# Patient Record
Sex: FEMALE | Race: WHITE | Employment: OTHER | URBAN - METROPOLITAN AREA
[De-identification: names, ages, dates, MRNs, and addresses within clinical notes are randomized per-mention and may not be internally consistent; named-entity substitution may affect disease eponyms.]

---

## 2021-09-15 ENCOUNTER — LAB ENCOUNTER (OUTPATIENT)
Dept: LAB | Facility: HOSPITAL | Age: 71
End: 2021-09-15
Attending: INTERNAL MEDICINE
Payer: MEDICARE

## 2021-09-15 ENCOUNTER — OFFICE VISIT (OUTPATIENT)
Dept: RHEUMATOLOGY | Facility: CLINIC | Age: 71
End: 2021-09-15
Payer: COMMERCIAL

## 2021-09-15 VITALS
HEART RATE: 61 BPM | WEIGHT: 180 LBS | SYSTOLIC BLOOD PRESSURE: 150 MMHG | BODY MASS INDEX: 33.99 KG/M2 | HEIGHT: 61 IN | DIASTOLIC BLOOD PRESSURE: 76 MMHG

## 2021-09-15 DIAGNOSIS — M35.3 PMR (POLYMYALGIA RHEUMATICA) (HCC): ICD-10-CM

## 2021-09-15 DIAGNOSIS — M35.3 PMR (POLYMYALGIA RHEUMATICA) (HCC): Primary | ICD-10-CM

## 2021-09-15 LAB
HBV CORE AB SERPL QL IA: NONREACTIVE
HBV SURFACE AB SER QL: NONREACTIVE
HBV SURFACE AB SERPL IA-ACNC: <3.1 MIU/ML
HBV SURFACE AG SER-ACNC: <0.1 [IU]/L
HBV SURFACE AG SERPL QL IA: NONREACTIVE
HCV AB SERPL QL IA: NONREACTIVE
RHEUMATOID FACT SERPL-ACNC: <10 IU/ML (ref ?–15)

## 2021-09-15 PROCEDURE — 86803 HEPATITIS C AB TEST: CPT | Performed by: INTERNAL MEDICINE

## 2021-09-15 PROCEDURE — 86200 CCP ANTIBODY: CPT | Performed by: INTERNAL MEDICINE

## 2021-09-15 PROCEDURE — 86431 RHEUMATOID FACTOR QUANT: CPT | Performed by: INTERNAL MEDICINE

## 2021-09-15 PROCEDURE — 36415 COLL VENOUS BLD VENIPUNCTURE: CPT

## 2021-09-15 PROCEDURE — 87340 HEPATITIS B SURFACE AG IA: CPT | Performed by: INTERNAL MEDICINE

## 2021-09-15 PROCEDURE — 82955 ASSAY OF G6PD ENZYME: CPT | Performed by: INTERNAL MEDICINE

## 2021-09-15 PROCEDURE — 99204 OFFICE O/P NEW MOD 45 MIN: CPT | Performed by: INTERNAL MEDICINE

## 2021-09-15 PROCEDURE — 86704 HEP B CORE ANTIBODY TOTAL: CPT | Performed by: INTERNAL MEDICINE

## 2021-09-15 PROCEDURE — 86480 TB TEST CELL IMMUN MEASURE: CPT

## 2021-09-15 PROCEDURE — 86706 HEP B SURFACE ANTIBODY: CPT | Performed by: INTERNAL MEDICINE

## 2021-09-15 RX ORDER — PREDNISONE 10 MG/1
10 TABLET ORAL DAILY
Qty: 90 TABLET | Refills: 0 | Status: SHIPPED | OUTPATIENT
Start: 2021-09-15

## 2021-09-15 RX ORDER — VIT C/HESPERIDIN/BIOFLAVONOIDS 500-100 MG
TABLET ORAL
COMMUNITY

## 2021-09-15 RX ORDER — ALBUTEROL SULFATE 90 UG/1
AEROSOL, METERED RESPIRATORY (INHALATION)
COMMUNITY

## 2021-09-15 RX ORDER — FLUTICASONE PROPIONATE 50 MCG
SPRAY, SUSPENSION (ML) NASAL
COMMUNITY

## 2021-09-15 RX ORDER — LEVOTHYROXINE SODIUM 112 UG/1
112 TABLET ORAL
COMMUNITY

## 2021-09-15 RX ORDER — MULTIVITAMIN WITH IRON
250 TABLET ORAL
COMMUNITY

## 2021-09-15 RX ORDER — CHLORAL HYDRATE 500 MG
3 CAPSULE ORAL DAILY
COMMUNITY

## 2021-09-15 RX ORDER — UBIDECARENONE 100 MG
100 CAPSULE ORAL DAILY
COMMUNITY
End: 2022-01-20

## 2021-09-15 RX ORDER — KRILL/OM-3/DHA/EPA/PHOSPHO/AST 500MG-86MG
CAPSULE ORAL
COMMUNITY

## 2021-09-15 RX ORDER — CHOLECALCIFEROL (VITAMIN D3) 50 MCG
TABLET ORAL 2 TIMES DAILY
COMMUNITY

## 2021-09-15 RX ORDER — MULTIVIT,IRON,MINERALS/LUTEIN
TABLET ORAL
COMMUNITY

## 2021-09-15 RX ORDER — PREDNISONE 1 MG/1
5 TABLET ORAL DAILY
Qty: 90 TABLET | Refills: 0 | Status: SHIPPED | OUTPATIENT
Start: 2021-09-15

## 2021-09-15 NOTE — PATIENT INSTRUCTIONS
You were seen today for bilateral shoulder stiffness due to PMR  Plan to increase prednisone to 20 mg daily for 2 weeks then 17.5 mg daily for 2 weeks then 15 mg daily for 2 weeks and then stay on 12.5 mg daily  Sometimes PMR can convert to rheumatoid arth

## 2021-09-15 NOTE — PROGRESS NOTES
Damion Clifford is a 73year old female who presents for Patient presents with:  Consult  Muscle Pain: Muscle weakness, loss in range of motion  Fatigue  Neck Pain  .    HPI:   CC: b/l shoulder stiffness  Consult: referred by PCP Dr. Nando Lipscomb Take 112 mcg by mouth before breakfast.     • Fexofenadine HCl (ALLEGRA ALLERGY OR) Take by mouth as needed. • ascorbic acid 1000 MG Oral Tab Take 1,000 mg by mouth daily. • Coenzyme Q10 100 MG Oral Cap Take 100 mg by mouth daily.         History re stiffness  NEURO: Cranial nerves II-XII intact grossly. 5/5 strength throughout in both upper and lower extremities, sensation intact.   PSYCH: normal mood    LABS:     Scanned    IMAGING:     None    ASSESSMENT AND PLAN:     B/L shoulder stiffness 2/2 PMR

## 2021-09-17 LAB
CCP IGG SERPL-ACNC: 0.5 U/ML (ref 0–6.9)
GLUCOSE-6-PHOSPHATE DEHYDROGENASE: 11.7 U/G HB

## 2021-11-04 ENCOUNTER — OFFICE VISIT (OUTPATIENT)
Dept: RHEUMATOLOGY | Facility: CLINIC | Age: 71
End: 2021-11-04
Payer: COMMERCIAL

## 2021-11-04 VITALS
BODY MASS INDEX: 35.12 KG/M2 | DIASTOLIC BLOOD PRESSURE: 72 MMHG | HEART RATE: 72 BPM | SYSTOLIC BLOOD PRESSURE: 153 MMHG | WEIGHT: 186 LBS | HEIGHT: 61 IN

## 2021-11-04 DIAGNOSIS — M35.3 PMR (POLYMYALGIA RHEUMATICA) (HCC): Primary | ICD-10-CM

## 2021-11-04 PROCEDURE — 99214 OFFICE O/P EST MOD 30 MIN: CPT | Performed by: INTERNAL MEDICINE

## 2021-11-04 RX ORDER — PREDNISONE 1 MG/1
TABLET ORAL
Qty: 200 TABLET | Refills: 0 | Status: SHIPPED | OUTPATIENT
Start: 2021-11-04

## 2021-11-04 RX ORDER — PREDNISONE 1 MG/1
TABLET ORAL
Qty: 90 TABLET | Refills: 0 | Status: SHIPPED | OUTPATIENT
Start: 2021-11-04

## 2021-11-04 NOTE — PROGRESS NOTES
Meghana Nicholas is a 73year old female. HPI:   No chief complaint on file. I had the pleasure of seeing Meghana Nicholas on 11/4/2021 for follow up PMR.      Current Medications:  Prednisone 12.5 mg daily- started mid Aug 2021  Blood work:  Neg AN big component. She feels tired all the time  She states that her sleep is not adequate. She wakes up multiple times at night. At times will watch TV.   She also has gained some weight, about 6 pounds in the past 1.5 months  Denies any other joint pain or Aero Soln albuterol sulfate HFA 90 mcg/actuation aerosol inhaler       . cmed  Allergies:    Penicillins             RASH  Sulfa Antibiotics       RASH      ROS:   All other ROS are negative.      PHYSICAL EXAM:   GEN: AAOx3, NAD  HEENT: EOMI, PERRLA, no inj Hb 11.7   HCV AB      Nonreactive  Nonreactive     Imaging:     None    ASSESSMENT/PLAN:     B/L shoulder stiffness 2/2 PMR  - Symptoms are classic for PMR. Bilateral shoulder stiffness. Elevated CRP and response to prednisone.   Symptoms did start after

## 2021-11-04 NOTE — PATIENT INSTRUCTIONS
You were seen today for PMR, symptoms do appear better controlled  You do have some more stiffness in the right shoulder.   We will continue to monitor this  Continue to taper prednisone  Continue 12.5 mg daily for 1 more week then go down to 10 mg daily fo

## 2022-01-12 ENCOUNTER — LAB ENCOUNTER (OUTPATIENT)
Dept: LAB | Facility: HOSPITAL | Age: 72
End: 2022-01-12
Attending: INTERNAL MEDICINE
Payer: MEDICARE

## 2022-01-12 DIAGNOSIS — M35.3 PMR (POLYMYALGIA RHEUMATICA) (HCC): ICD-10-CM

## 2022-01-12 LAB
CRP SERPL-MCNC: 1.15 MG/DL (ref ?–0.3)
ERYTHROCYTE [SEDIMENTATION RATE] IN BLOOD: 8 MM/HR

## 2022-01-12 PROCEDURE — 85652 RBC SED RATE AUTOMATED: CPT

## 2022-01-12 PROCEDURE — 86140 C-REACTIVE PROTEIN: CPT

## 2022-01-12 PROCEDURE — 36415 COLL VENOUS BLD VENIPUNCTURE: CPT

## 2022-01-20 ENCOUNTER — OFFICE VISIT (OUTPATIENT)
Dept: RHEUMATOLOGY | Facility: CLINIC | Age: 72
End: 2022-01-20
Payer: MEDICARE

## 2022-01-20 VITALS
HEIGHT: 61 IN | WEIGHT: 190 LBS | SYSTOLIC BLOOD PRESSURE: 147 MMHG | DIASTOLIC BLOOD PRESSURE: 74 MMHG | BODY MASS INDEX: 35.87 KG/M2 | HEART RATE: 62 BPM

## 2022-01-20 DIAGNOSIS — M35.3 PMR (POLYMYALGIA RHEUMATICA) (HCC): Primary | ICD-10-CM

## 2022-01-20 DIAGNOSIS — Z51.81 MEDICATION MONITORING ENCOUNTER: ICD-10-CM

## 2022-01-20 PROCEDURE — 99213 OFFICE O/P EST LOW 20 MIN: CPT | Performed by: INTERNAL MEDICINE

## 2022-01-20 RX ORDER — PREDNISONE 1 MG/1
TABLET ORAL
Qty: 100 TABLET | Refills: 0 | Status: SHIPPED | OUTPATIENT
Start: 2022-01-20

## 2022-01-20 NOTE — PATIENT INSTRUCTIONS
You were seen today for PMR  Symptoms continue to be stable  Recent blood work shows slightly elevated inflammation marker  Continue to taper prednisone take 5 mg daily for 2 weeks then 4 mg daily for 2 weeks then 3 mg daily for 2 weeks then 2 mg daily for

## 2022-01-20 NOTE — PROGRESS NOTES
Joshua Marroquin is a 73year old female. HPI:   Patient presents with: Follow - Up  Medication Follow-Up  Condition Update      I had the pleasure of seeing Joshua Marroquin on 1/20/20221 for follow up PMR.      Current Medications:  Pred stiffness in her thighs or hip region  She reports fatigue being a big component. She feels tired all the time  She states that her sleep is not adequate. She wakes up multiple times at night. At times will watch TV.   She also has gained some weight, ab Oral Tab Take by mouth. • Calcium Carbonate-Vit D-Min (CALTRATE 600+D PLUS OR) Take by mouth. • Krill Oil 500 MG Oral Cap Take by mouth. • Probiotic Product ([place]) Take by mouth.      • Lactobacillus (ACIDOPHOLUS OR) Take by IU/mL 0.08   Quantiferon-TB2 Minus NIL      IU/mL 0.02   Quantiferon TB Mitogen minus NIL      IU/mL >10.00   QTB. RESULT      Negative Negative   HBSAg Screen      Nonreactive  Nonreactive   Hbsag Screen Index       <0.10   HEPATITIS B SURFACE AB QUAL

## 2022-03-07 ENCOUNTER — PATIENT MESSAGE (OUTPATIENT)
Dept: RHEUMATOLOGY | Facility: CLINIC | Age: 72
End: 2022-03-07

## 2022-03-07 NOTE — TELEPHONE ENCOUNTER
From: Wisam Timmons  To: Barb Diaz MD  Sent: 3/7/2022 11:01 AM CST  Subject: reducing prednisone    I have been on 4 mg of prednisone for the last 2 weeks and will reduce my dose starting tomorrow. I was wondering if I could just drop 1/2 a mg at a time instead of a full mg since I had some stiffness and pain last time I dropped a full mg. Maybe drop 1/2 mg every week or 10 days. Let me know what you think. Thank you.

## 2022-03-17 ENCOUNTER — PATIENT MESSAGE (OUTPATIENT)
Dept: RHEUMATOLOGY | Facility: CLINIC | Age: 72
End: 2022-03-17

## 2022-03-17 NOTE — TELEPHONE ENCOUNTER
Dr. Paulino Slight, please review message below about tapering prednisone and request for a new script and advise. Prednisone pended, please change sig.      \"You were seen today for PMR  Symptoms continue to be stable  Recent blood work shows slightly elevated inflammation marker  Continue to taper prednisone take 5 mg daily for 2 weeks then 4 mg daily for 2 weeks then 3 mg daily for 2 weeks then 2 mg daily for 2 weeks then 1 mg daily for 2 weeks and then stop  Blood work in 2 mos   Follow up in 2 mos\"

## 2022-03-17 NOTE — TELEPHONE ENCOUNTER
From: Yasmine Trejo  To: Kd Melendrez MD  Sent: 3/17/2022 11:17 AM CDT  Subject: refill 1mg prednisone needed & questions    Could you please order about 100 - 1 mg prednisone for me from [place], [place]? I have enough for a couple of days. I am going to drop to 3 mg tomorrow. I am going to try to drop 1/2 mg every 10 days. Since I am going down at a slower rate, do you want me to get my labs done and a visit to you when I am completely off the prednisone or sooner. Originally we thought I would be dropping at 1 mg every 2 weeks and you order labs for mid March and asked me to make an appointment to see you when I was almost off the prednisone. Let me know. Thank you.

## 2022-03-21 RX ORDER — PREDNISONE 1 MG/1
TABLET ORAL
Qty: 100 TABLET | Refills: 0 | Status: SHIPPED | OUTPATIENT
Start: 2022-03-21

## 2022-04-05 ENCOUNTER — TELEPHONE (OUTPATIENT)
Dept: RHEUMATOLOGY | Facility: CLINIC | Age: 72
End: 2022-04-05

## 2022-04-05 NOTE — TELEPHONE ENCOUNTER
Patient has pain in achilles tendon, swollen, throbbing, burning, hurts to touch even, limping very much, started 3-31; she has been decreasing her Prednisone, taking 2 mg currently. Should she see Dr. Ríos Sees or her pcp? Please call patient to advise.

## 2022-04-05 NOTE — TELEPHONE ENCOUNTER
Called patient back. Developed Achilles tendinitis about a week ago. Hard to walk on her foot due to the pain. Her PMR symptoms are stable on 2 mg of prednisone. Advised patient that she can take Advil 1 to 2 pills a day as needed and Tylenol arthritis. She will be seeing her PCP today.   I think it is less likely from Krish 73 but if she continues to have joint pain and swelling in other joints it could be an inflammatory process

## 2022-04-05 NOTE — TELEPHONE ENCOUNTER
Please see below. Pt reports 6 days of worsening pain in achilles tendon. Rates pain 1/10 at rest and 5-10/10 with movement. Reports throbbing, burning pain. Tried OTC Advil, uncertain if it provided relief. Denies injury or overuse. Pt is concerned it it related to PMR. Pt advised to see PCP for further evaluation and management due to acute onset. Please advise.

## 2022-04-19 ENCOUNTER — LAB ENCOUNTER (OUTPATIENT)
Dept: LAB | Facility: HOSPITAL | Age: 72
End: 2022-04-19
Attending: INTERNAL MEDICINE
Payer: MEDICARE

## 2022-04-19 DIAGNOSIS — M35.3 PMR (POLYMYALGIA RHEUMATICA) (HCC): ICD-10-CM

## 2022-04-19 LAB
CRP SERPL-MCNC: 1.94 MG/DL (ref ?–0.3)
ERYTHROCYTE [SEDIMENTATION RATE] IN BLOOD: 10 MM/HR

## 2022-04-19 PROCEDURE — 86140 C-REACTIVE PROTEIN: CPT

## 2022-04-19 PROCEDURE — 85652 RBC SED RATE AUTOMATED: CPT

## 2022-04-19 PROCEDURE — 36415 COLL VENOUS BLD VENIPUNCTURE: CPT

## 2022-04-25 ENCOUNTER — OFFICE VISIT (OUTPATIENT)
Dept: RHEUMATOLOGY | Facility: CLINIC | Age: 72
End: 2022-04-25
Payer: MEDICARE

## 2022-04-25 ENCOUNTER — LAB ENCOUNTER (OUTPATIENT)
Dept: LAB | Facility: HOSPITAL | Age: 72
End: 2022-04-25
Attending: INTERNAL MEDICINE
Payer: MEDICARE

## 2022-04-25 VITALS
SYSTOLIC BLOOD PRESSURE: 143 MMHG | HEIGHT: 61 IN | HEART RATE: 70 BPM | DIASTOLIC BLOOD PRESSURE: 79 MMHG | BODY MASS INDEX: 35.87 KG/M2 | WEIGHT: 190 LBS

## 2022-04-25 DIAGNOSIS — Z51.81 MEDICATION MONITORING ENCOUNTER: ICD-10-CM

## 2022-04-25 DIAGNOSIS — M35.3 PMR (POLYMYALGIA RHEUMATICA) (HCC): ICD-10-CM

## 2022-04-25 DIAGNOSIS — M35.3 PMR (POLYMYALGIA RHEUMATICA) (HCC): Primary | ICD-10-CM

## 2022-04-25 LAB
CK SERPL-CCNC: 84 U/L
RHEUMATOID FACT SERPL-ACNC: <10 IU/ML (ref ?–15)

## 2022-04-25 PROCEDURE — 81374 HLA I TYPING 1 ANTIGEN LR: CPT

## 2022-04-25 PROCEDURE — 86431 RHEUMATOID FACTOR QUANT: CPT | Performed by: INTERNAL MEDICINE

## 2022-04-25 PROCEDURE — 99214 OFFICE O/P EST MOD 30 MIN: CPT | Performed by: INTERNAL MEDICINE

## 2022-04-25 PROCEDURE — 82550 ASSAY OF CK (CPK): CPT

## 2022-04-25 PROCEDURE — 36415 COLL VENOUS BLD VENIPUNCTURE: CPT

## 2022-04-25 PROCEDURE — 86200 CCP ANTIBODY: CPT | Performed by: INTERNAL MEDICINE

## 2022-04-25 RX ORDER — PREDNISONE 1 MG/1
5 TABLET ORAL DAILY
Qty: 30 TABLET | Refills: 0 | Status: SHIPPED | OUTPATIENT
Start: 2022-04-25

## 2022-04-25 RX ORDER — PREDNISONE 1 MG/1
TABLET ORAL
Qty: 200 TABLET | Refills: 0 | Status: SHIPPED | OUTPATIENT
Start: 2022-04-25

## 2022-04-25 NOTE — PATIENT INSTRUCTIONS
You were seen today for PMR  Blood work today   Prednisone taper down as directed, 5 mg daily for 3 weeks then 4.5 mg daily for 3 weeks and continue go to go down by 1/2 mg every 3 weeks   Follow up in 2 mos

## 2022-04-26 LAB — CCP IGG SERPL-ACNC: 1.4 U/ML (ref 0–6.9)

## 2022-04-27 LAB — HLA-B27: NEGATIVE

## 2022-05-19 ENCOUNTER — OFFICE VISIT (OUTPATIENT)
Dept: RHEUMATOLOGY | Facility: CLINIC | Age: 72
End: 2022-05-19
Payer: MEDICARE

## 2022-05-19 VITALS
SYSTOLIC BLOOD PRESSURE: 147 MMHG | BODY MASS INDEX: 35.68 KG/M2 | WEIGHT: 189 LBS | DIASTOLIC BLOOD PRESSURE: 69 MMHG | HEIGHT: 61 IN | HEART RATE: 62 BPM

## 2022-05-19 DIAGNOSIS — Z51.81 MEDICATION MONITORING ENCOUNTER: ICD-10-CM

## 2022-05-19 DIAGNOSIS — M35.3 PMR (POLYMYALGIA RHEUMATICA) (HCC): Primary | ICD-10-CM

## 2022-05-19 PROCEDURE — 99214 OFFICE O/P EST MOD 30 MIN: CPT | Performed by: INTERNAL MEDICINE

## 2022-05-19 RX ORDER — FOLIC ACID 1 MG/1
1 TABLET ORAL DAILY
Qty: 90 TABLET | Refills: 0 | Status: SHIPPED | OUTPATIENT
Start: 2022-05-19

## 2022-05-19 RX ORDER — METHOTREXATE 2.5 MG/1
TABLET ORAL
Qty: 72 TABLET | Refills: 0 | Status: SHIPPED | OUTPATIENT
Start: 2022-05-19

## 2022-05-19 NOTE — PATIENT INSTRUCTIONS
You were seen today for PMR and likely Rheumatoid arthritis/inflammatory arthritis   Drop prednisone to 9 mg daily for 1 month then 8 mg daily for a month  Start methotrexate after the wedding, take 4 pills weekly for 2 weeks then increase to 6 pills weekly  Also folic acid daily  Follow up in 6-8 weeks after taking methotrexate

## 2022-06-09 ENCOUNTER — OFFICE VISIT (OUTPATIENT)
Dept: RHEUMATOLOGY | Facility: CLINIC | Age: 72
End: 2022-06-09
Payer: MEDICARE

## 2022-06-09 VITALS
DIASTOLIC BLOOD PRESSURE: 70 MMHG | HEART RATE: 69 BPM | WEIGHT: 190 LBS | HEIGHT: 61 IN | BODY MASS INDEX: 35.87 KG/M2 | SYSTOLIC BLOOD PRESSURE: 145 MMHG

## 2022-06-09 DIAGNOSIS — M06.09 RHEUMATOID ARTHRITIS OF MULTIPLE SITES WITH NEGATIVE RHEUMATOID FACTOR (HCC): Primary | ICD-10-CM

## 2022-06-09 DIAGNOSIS — M76.61 ACHILLES TENDINITIS OF RIGHT LOWER EXTREMITY: ICD-10-CM

## 2022-06-09 DIAGNOSIS — Z51.81 MEDICATION MONITORING ENCOUNTER: ICD-10-CM

## 2022-06-09 DIAGNOSIS — M35.3 PMR (POLYMYALGIA RHEUMATICA) (HCC): ICD-10-CM

## 2022-06-09 PROCEDURE — 99214 OFFICE O/P EST MOD 30 MIN: CPT | Performed by: INTERNAL MEDICINE

## 2022-06-09 RX ORDER — PREDNISONE 1 MG/1
TABLET ORAL
Qty: 200 TABLET | Refills: 0 | Status: SHIPPED | OUTPATIENT
Start: 2022-06-09

## 2022-06-09 RX ORDER — PREDNISONE 1 MG/1
5 TABLET ORAL DAILY
Qty: 90 TABLET | Refills: 0 | Status: SHIPPED | OUTPATIENT
Start: 2022-06-09

## 2022-06-09 NOTE — PATIENT INSTRUCTIONS
Plan to increase prednisone 20 mg daily for 5 days  Then drop down to 9 mg daily and continue to taper by 1 mg every month  Start methtrexate 4 pills weekly for 2 weeks then 6 pills weekly   Start in 2 weeks  Also folic acid daily  See min in 6-8 weeks

## 2022-08-03 ENCOUNTER — HOSPITAL ENCOUNTER (OUTPATIENT)
Dept: GENERAL RADIOLOGY | Facility: HOSPITAL | Age: 72
Discharge: HOME | End: 2022-08-03
Attending: INTERNAL MEDICINE
Payer: MEDICARE

## 2022-08-03 ENCOUNTER — LAB ENCOUNTER (OUTPATIENT)
Dept: LAB | Facility: HOSPITAL | Age: 72
End: 2022-08-03
Attending: INTERNAL MEDICINE
Payer: MEDICARE

## 2022-08-03 ENCOUNTER — OFFICE VISIT (OUTPATIENT)
Dept: RHEUMATOLOGY | Facility: CLINIC | Age: 72
End: 2022-08-03
Payer: MEDICARE

## 2022-08-03 VITALS
HEIGHT: 61 IN | WEIGHT: 191 LBS | DIASTOLIC BLOOD PRESSURE: 67 MMHG | SYSTOLIC BLOOD PRESSURE: 109 MMHG | HEART RATE: 65 BPM | BODY MASS INDEX: 36.06 KG/M2

## 2022-08-03 DIAGNOSIS — M06.09 RHEUMATOID ARTHRITIS OF MULTIPLE SITES WITH NEGATIVE RHEUMATOID FACTOR (HCC): ICD-10-CM

## 2022-08-03 DIAGNOSIS — Z51.81 MEDICATION MONITORING ENCOUNTER: ICD-10-CM

## 2022-08-03 DIAGNOSIS — M35.3 PMR (POLYMYALGIA RHEUMATICA) (HCC): Primary | ICD-10-CM

## 2022-08-03 DIAGNOSIS — M35.3 PMR (POLYMYALGIA RHEUMATICA) (HCC): ICD-10-CM

## 2022-08-03 LAB
ALBUMIN SERPL-MCNC: 3.8 G/DL (ref 3.4–5)
ALT SERPL-CCNC: 42 U/L
AST SERPL-CCNC: 23 U/L (ref 15–37)
BASOPHILS # BLD AUTO: 0.08 X10(3) UL (ref 0–0.2)
BASOPHILS NFR BLD AUTO: 0.7 %
CREAT BLD-MCNC: 0.85 MG/DL
CRP SERPL-MCNC: 0.79 MG/DL (ref ?–0.3)
DEPRECATED RDW RBC AUTO: 45.5 FL (ref 35.1–46.3)
EOSINOPHIL # BLD AUTO: 0.09 X10(3) UL (ref 0–0.7)
EOSINOPHIL NFR BLD AUTO: 0.8 %
ERYTHROCYTE [DISTWIDTH] IN BLOOD BY AUTOMATED COUNT: 13 % (ref 11–15)
ERYTHROCYTE [SEDIMENTATION RATE] IN BLOOD: 10 MM/HR
GFR SERPLBLD BASED ON 1.73 SQ M-ARVRAT: 74 ML/MIN/1.73M2 (ref 60–?)
HCT VFR BLD AUTO: 43.9 %
HGB BLD-MCNC: 14.2 G/DL
IMM GRANULOCYTES # BLD AUTO: 0.05 X10(3) UL (ref 0–1)
IMM GRANULOCYTES NFR BLD: 0.4 %
LYMPHOCYTES # BLD AUTO: 2.92 X10(3) UL (ref 1–4)
LYMPHOCYTES NFR BLD AUTO: 25 %
MCH RBC QN AUTO: 30.9 PG (ref 26–34)
MCHC RBC AUTO-ENTMCNC: 32.3 G/DL (ref 31–37)
MCV RBC AUTO: 95.6 FL
MONOCYTES # BLD AUTO: 1.02 X10(3) UL (ref 0.1–1)
MONOCYTES NFR BLD AUTO: 8.7 %
NEUTROPHILS # BLD AUTO: 7.5 X10 (3) UL (ref 1.5–7.7)
NEUTROPHILS # BLD AUTO: 7.5 X10(3) UL (ref 1.5–7.7)
NEUTROPHILS NFR BLD AUTO: 64.4 %
PLATELET # BLD AUTO: 361 10(3)UL (ref 150–450)
RBC # BLD AUTO: 4.59 X10(6)UL
WBC # BLD AUTO: 11.7 X10(3) UL (ref 4–11)

## 2022-08-03 PROCEDURE — 72202 X-RAY EXAM SI JOINTS 3/> VWS: CPT | Performed by: INTERNAL MEDICINE

## 2022-08-03 PROCEDURE — 84460 ALANINE AMINO (ALT) (SGPT): CPT

## 2022-08-03 PROCEDURE — 99214 OFFICE O/P EST MOD 30 MIN: CPT | Performed by: INTERNAL MEDICINE

## 2022-08-03 PROCEDURE — 82565 ASSAY OF CREATININE: CPT

## 2022-08-03 PROCEDURE — 84450 TRANSFERASE (AST) (SGOT): CPT

## 2022-08-03 PROCEDURE — 82040 ASSAY OF SERUM ALBUMIN: CPT

## 2022-08-03 PROCEDURE — 36415 COLL VENOUS BLD VENIPUNCTURE: CPT

## 2022-08-03 PROCEDURE — 72110 X-RAY EXAM L-2 SPINE 4/>VWS: CPT | Performed by: INTERNAL MEDICINE

## 2022-08-03 PROCEDURE — 86140 C-REACTIVE PROTEIN: CPT

## 2022-08-03 PROCEDURE — 85025 COMPLETE CBC W/AUTO DIFF WBC: CPT

## 2022-08-03 PROCEDURE — 85652 RBC SED RATE AUTOMATED: CPT

## 2022-08-03 RX ORDER — METHOTREXATE 2.5 MG/1
15 TABLET ORAL WEEKLY
Qty: 78 TABLET | Refills: 0 | Status: SHIPPED | OUTPATIENT
Start: 2022-08-03

## 2022-08-03 RX ORDER — FOLIC ACID 1 MG/1
1 TABLET ORAL DAILY
Qty: 90 TABLET | Refills: 2 | Status: SHIPPED | OUTPATIENT
Start: 2022-08-03

## 2022-08-03 RX ORDER — METHOTREXATE 2.5 MG/1
15 TABLET ORAL WEEKLY
Qty: 78 TABLET | Refills: 0 | Status: SHIPPED | OUTPATIENT
Start: 2022-08-03 | End: 2022-08-03

## 2022-08-03 RX ORDER — FOLIC ACID 1 MG/1
1 TABLET ORAL DAILY
Qty: 90 TABLET | Refills: 2 | Status: SHIPPED | OUTPATIENT
Start: 2022-08-03 | End: 2022-08-03

## 2022-08-03 NOTE — PATIENT INSTRUCTIONS
You were seen today for PMR and Rheumatoid arthritis  Continue taper prednisone monthly by 1 mg   Continue methotrexate 6 pills weekly and FA daily  Blood work today   See me in 3 mos

## 2022-11-02 ENCOUNTER — LAB ENCOUNTER (OUTPATIENT)
Dept: LAB | Facility: HOSPITAL | Age: 72
End: 2022-11-02
Attending: INTERNAL MEDICINE
Payer: MEDICARE

## 2022-11-02 DIAGNOSIS — Z51.81 MEDICATION MONITORING ENCOUNTER: ICD-10-CM

## 2022-11-02 DIAGNOSIS — M35.3 PMR (POLYMYALGIA RHEUMATICA) (HCC): ICD-10-CM

## 2022-11-02 DIAGNOSIS — M06.09 RHEUMATOID ARTHRITIS OF MULTIPLE SITES WITH NEGATIVE RHEUMATOID FACTOR (HCC): ICD-10-CM

## 2022-11-02 LAB
BASOPHILS # BLD AUTO: 0.11 X10(3) UL (ref 0–0.2)
BASOPHILS NFR BLD AUTO: 0.9 %
CRP SERPL-MCNC: 1.28 MG/DL (ref ?–0.3)
DEPRECATED RDW RBC AUTO: 45 FL (ref 35.1–46.3)
EOSINOPHIL # BLD AUTO: 0.16 X10(3) UL (ref 0–0.7)
EOSINOPHIL NFR BLD AUTO: 1.3 %
ERYTHROCYTE [DISTWIDTH] IN BLOOD BY AUTOMATED COUNT: 12.7 % (ref 11–15)
ERYTHROCYTE [SEDIMENTATION RATE] IN BLOOD: 19 MM/HR
HCT VFR BLD AUTO: 42 %
HGB BLD-MCNC: 13.9 G/DL
IMM GRANULOCYTES # BLD AUTO: 0.04 X10(3) UL (ref 0–1)
IMM GRANULOCYTES NFR BLD: 0.3 %
LYMPHOCYTES # BLD AUTO: 3.24 X10(3) UL (ref 1–4)
LYMPHOCYTES NFR BLD AUTO: 27.3 %
MCH RBC QN AUTO: 31.8 PG (ref 26–34)
MCHC RBC AUTO-ENTMCNC: 33.1 G/DL (ref 31–37)
MCV RBC AUTO: 96.1 FL
MONOCYTES # BLD AUTO: 1.22 X10(3) UL (ref 0.1–1)
MONOCYTES NFR BLD AUTO: 10.3 %
NEUTROPHILS # BLD AUTO: 7.09 X10 (3) UL (ref 1.5–7.7)
NEUTROPHILS # BLD AUTO: 7.09 X10(3) UL (ref 1.5–7.7)
NEUTROPHILS NFR BLD AUTO: 59.9 %
PLATELET # BLD AUTO: 358 10(3)UL (ref 150–450)
RBC # BLD AUTO: 4.37 X10(6)UL
WBC # BLD AUTO: 11.9 X10(3) UL (ref 4–11)

## 2022-11-02 PROCEDURE — 85025 COMPLETE CBC W/AUTO DIFF WBC: CPT

## 2022-11-02 PROCEDURE — 85652 RBC SED RATE AUTOMATED: CPT

## 2022-11-02 PROCEDURE — 36415 COLL VENOUS BLD VENIPUNCTURE: CPT

## 2022-11-02 PROCEDURE — 86140 C-REACTIVE PROTEIN: CPT

## 2022-11-07 ENCOUNTER — OFFICE VISIT (OUTPATIENT)
Dept: RHEUMATOLOGY | Facility: CLINIC | Age: 72
End: 2022-11-07
Payer: MEDICARE

## 2022-11-07 VITALS
SYSTOLIC BLOOD PRESSURE: 146 MMHG | WEIGHT: 191.38 LBS | HEIGHT: 61 IN | HEART RATE: 77 BPM | DIASTOLIC BLOOD PRESSURE: 77 MMHG | BODY MASS INDEX: 36.13 KG/M2

## 2022-11-07 DIAGNOSIS — M35.3 PMR (POLYMYALGIA RHEUMATICA) (HCC): Primary | ICD-10-CM

## 2022-11-07 DIAGNOSIS — M06.09 RHEUMATOID ARTHRITIS OF MULTIPLE SITES WITH NEGATIVE RHEUMATOID FACTOR (HCC): ICD-10-CM

## 2022-11-07 DIAGNOSIS — Z51.81 MEDICATION MONITORING ENCOUNTER: ICD-10-CM

## 2022-11-07 PROCEDURE — 99214 OFFICE O/P EST MOD 30 MIN: CPT | Performed by: INTERNAL MEDICINE

## 2022-11-07 RX ORDER — PREDNISONE 1 MG/1
TABLET ORAL
Qty: 200 TABLET | Refills: 0 | Status: SHIPPED | OUTPATIENT
Start: 2022-11-07

## 2022-11-07 RX ORDER — METHOTREXATE 2.5 MG/1
15 TABLET ORAL WEEKLY
Qty: 78 TABLET | Refills: 1 | Status: SHIPPED | OUTPATIENT
Start: 2022-11-07

## 2023-01-03 ENCOUNTER — PATIENT MESSAGE (OUTPATIENT)
Dept: RHEUMATOLOGY | Facility: CLINIC | Age: 73
End: 2023-01-03

## 2023-01-03 NOTE — TELEPHONE ENCOUNTER
From: Michael Luis  To: Dana Donaldson MD  Sent: 1/3/2023 1:15 PM CST  Subject: PMR pain & stiffness after reducing prednisone    I have been reducing prednisone and at 3 mg (since 11-26-22) I had increasing pain and stiffness. 12-25-22 I went down to 2mg and had terrible pain and stiffness, hard to put on deodorant, wash my face, walk and hard to do most things. My muscles seemed to weaken and become more flabby since going to 3mg. Yesterday and today I took 4 mg of prednisone because I could not stand it anymore. I have noticed an incredible improvement. I normally would not increase on my own, but with the holidays and offices being closed, I thought a small increase would not be harmful. Do you want me to make an appointment to see you or can I stay on an increased amount of prednisone? Please let me know.

## 2023-01-04 NOTE — TELEPHONE ENCOUNTER
If you can let patient know that she can stay on prednisone 4 mg for now. I would like for her to get blood work to see where her inflammation markers are and see me maybe in the next couple of weeks.

## 2023-01-06 ENCOUNTER — LAB ENCOUNTER (OUTPATIENT)
Dept: LAB | Facility: HOSPITAL | Age: 73
End: 2023-01-06
Attending: INTERNAL MEDICINE
Payer: MEDICARE

## 2023-01-06 DIAGNOSIS — Z51.81 MEDICATION MONITORING ENCOUNTER: ICD-10-CM

## 2023-01-06 DIAGNOSIS — M06.09 RHEUMATOID ARTHRITIS OF MULTIPLE SITES WITH NEGATIVE RHEUMATOID FACTOR (HCC): ICD-10-CM

## 2023-01-06 DIAGNOSIS — M35.3 PMR (POLYMYALGIA RHEUMATICA) (HCC): ICD-10-CM

## 2023-01-06 LAB
ALBUMIN SERPL-MCNC: 3.8 G/DL (ref 3.4–5)
ALT SERPL-CCNC: 27 U/L
AST SERPL-CCNC: 16 U/L (ref 15–37)
BASOPHILS # BLD AUTO: 0.12 X10(3) UL (ref 0–0.2)
BASOPHILS NFR BLD AUTO: 0.9 %
CREAT BLD-MCNC: 0.62 MG/DL
CRP SERPL-MCNC: 2.32 MG/DL (ref ?–0.3)
DEPRECATED RDW RBC AUTO: 43.8 FL (ref 35.1–46.3)
EOSINOPHIL # BLD AUTO: 0.28 X10(3) UL (ref 0–0.7)
EOSINOPHIL NFR BLD AUTO: 2.1 %
ERYTHROCYTE [DISTWIDTH] IN BLOOD BY AUTOMATED COUNT: 12.7 % (ref 11–15)
ERYTHROCYTE [SEDIMENTATION RATE] IN BLOOD: 19 MM/HR
GFR SERPLBLD BASED ON 1.73 SQ M-ARVRAT: 96 ML/MIN/1.73M2 (ref 60–?)
HCT VFR BLD AUTO: 42.2 %
HGB BLD-MCNC: 13.8 G/DL
IMM GRANULOCYTES # BLD AUTO: 0.05 X10(3) UL (ref 0–1)
IMM GRANULOCYTES NFR BLD: 0.4 %
LYMPHOCYTES # BLD AUTO: 3.05 X10(3) UL (ref 1–4)
LYMPHOCYTES NFR BLD AUTO: 22.7 %
MCH RBC QN AUTO: 30.8 PG (ref 26–34)
MCHC RBC AUTO-ENTMCNC: 32.7 G/DL (ref 31–37)
MCV RBC AUTO: 94.2 FL
MONOCYTES # BLD AUTO: 1.13 X10(3) UL (ref 0.1–1)
MONOCYTES NFR BLD AUTO: 8.4 %
NEUTROPHILS # BLD AUTO: 8.81 X10 (3) UL (ref 1.5–7.7)
NEUTROPHILS # BLD AUTO: 8.81 X10(3) UL (ref 1.5–7.7)
NEUTROPHILS NFR BLD AUTO: 65.5 %
PLATELET # BLD AUTO: 420 10(3)UL (ref 150–450)
RBC # BLD AUTO: 4.48 X10(6)UL
WBC # BLD AUTO: 13.4 X10(3) UL (ref 4–11)

## 2023-01-06 PROCEDURE — 82565 ASSAY OF CREATININE: CPT

## 2023-01-06 PROCEDURE — 84460 ALANINE AMINO (ALT) (SGPT): CPT

## 2023-01-06 PROCEDURE — 86140 C-REACTIVE PROTEIN: CPT

## 2023-01-06 PROCEDURE — 82040 ASSAY OF SERUM ALBUMIN: CPT

## 2023-01-06 PROCEDURE — 85025 COMPLETE CBC W/AUTO DIFF WBC: CPT

## 2023-01-06 PROCEDURE — 36415 COLL VENOUS BLD VENIPUNCTURE: CPT

## 2023-01-06 PROCEDURE — 84450 TRANSFERASE (AST) (SGOT): CPT

## 2023-01-06 PROCEDURE — 85652 RBC SED RATE AUTOMATED: CPT

## 2023-01-12 ENCOUNTER — OFFICE VISIT (OUTPATIENT)
Dept: RHEUMATOLOGY | Facility: CLINIC | Age: 73
End: 2023-01-12
Payer: MEDICARE

## 2023-01-12 VITALS
HEART RATE: 65 BPM | HEIGHT: 61 IN | BODY MASS INDEX: 35.3 KG/M2 | SYSTOLIC BLOOD PRESSURE: 138 MMHG | WEIGHT: 187 LBS | DIASTOLIC BLOOD PRESSURE: 70 MMHG

## 2023-01-12 DIAGNOSIS — Z51.81 MEDICATION MONITORING ENCOUNTER: ICD-10-CM

## 2023-01-12 DIAGNOSIS — M35.3 PMR (POLYMYALGIA RHEUMATICA) (HCC): Primary | ICD-10-CM

## 2023-01-12 DIAGNOSIS — M06.09 RHEUMATOID ARTHRITIS OF MULTIPLE SITES WITH NEGATIVE RHEUMATOID FACTOR (HCC): ICD-10-CM

## 2023-01-12 PROCEDURE — 99214 OFFICE O/P EST MOD 30 MIN: CPT | Performed by: INTERNAL MEDICINE

## 2023-01-12 RX ORDER — METHOTREXATE 2.5 MG/1
20 TABLET ORAL WEEKLY
Qty: 104 TABLET | Refills: 1 | Status: SHIPPED | OUTPATIENT
Start: 2023-01-12

## 2023-01-12 RX ORDER — PREDNISONE 1 MG/1
5 TABLET ORAL DAILY
Qty: 90 TABLET | Refills: 0 | Status: SHIPPED | OUTPATIENT
Start: 2023-01-12

## 2023-01-12 NOTE — PATIENT INSTRUCTIONS
Plan to increase methotrexate 8 pills weekly  Continue folic acid 1 pill a day   Start prednisone 5 mg daily  Blood work in April  See me in April

## 2023-04-05 ENCOUNTER — LAB ENCOUNTER (OUTPATIENT)
Dept: LAB | Facility: HOSPITAL | Age: 73
End: 2023-04-05
Attending: INTERNAL MEDICINE
Payer: MEDICARE

## 2023-04-05 DIAGNOSIS — M35.3 PMR (POLYMYALGIA RHEUMATICA) (HCC): ICD-10-CM

## 2023-04-05 DIAGNOSIS — Z51.81 MEDICATION MONITORING ENCOUNTER: ICD-10-CM

## 2023-04-05 DIAGNOSIS — M06.09 RHEUMATOID ARTHRITIS OF MULTIPLE SITES WITH NEGATIVE RHEUMATOID FACTOR (HCC): ICD-10-CM

## 2023-04-05 LAB
ALBUMIN SERPL-MCNC: 3.9 G/DL (ref 3.4–5)
ALT SERPL-CCNC: 38 U/L
AST SERPL-CCNC: 19 U/L (ref 15–37)
BASOPHILS # BLD AUTO: 0.1 X10(3) UL (ref 0–0.2)
BASOPHILS NFR BLD AUTO: 0.9 %
CREAT BLD-MCNC: 0.66 MG/DL
CRP SERPL-MCNC: 1.12 MG/DL (ref ?–0.3)
DEPRECATED RDW RBC AUTO: 46.7 FL (ref 35.1–46.3)
EOSINOPHIL # BLD AUTO: 0.08 X10(3) UL (ref 0–0.7)
EOSINOPHIL NFR BLD AUTO: 0.7 %
ERYTHROCYTE [DISTWIDTH] IN BLOOD BY AUTOMATED COUNT: 13.7 % (ref 11–15)
ERYTHROCYTE [SEDIMENTATION RATE] IN BLOOD: 8 MM/HR
GFR SERPLBLD BASED ON 1.73 SQ M-ARVRAT: 94 ML/MIN/1.73M2 (ref 60–?)
HCT VFR BLD AUTO: 42.8 %
HGB BLD-MCNC: 14.2 G/DL
IMM GRANULOCYTES # BLD AUTO: 0.04 X10(3) UL (ref 0–1)
IMM GRANULOCYTES NFR BLD: 0.3 %
LYMPHOCYTES # BLD AUTO: 3.31 X10(3) UL (ref 1–4)
LYMPHOCYTES NFR BLD AUTO: 28.2 %
MCH RBC QN AUTO: 31.1 PG (ref 26–34)
MCHC RBC AUTO-ENTMCNC: 33.2 G/DL (ref 31–37)
MCV RBC AUTO: 93.9 FL
MONOCYTES # BLD AUTO: 0.85 X10(3) UL (ref 0.1–1)
MONOCYTES NFR BLD AUTO: 7.2 %
NEUTROPHILS # BLD AUTO: 7.36 X10 (3) UL (ref 1.5–7.7)
NEUTROPHILS # BLD AUTO: 7.36 X10(3) UL (ref 1.5–7.7)
NEUTROPHILS NFR BLD AUTO: 62.7 %
PLATELET # BLD AUTO: 386 10(3)UL (ref 150–450)
RBC # BLD AUTO: 4.56 X10(6)UL
WBC # BLD AUTO: 11.7 X10(3) UL (ref 4–11)

## 2023-04-05 PROCEDURE — 85025 COMPLETE CBC W/AUTO DIFF WBC: CPT

## 2023-04-05 PROCEDURE — 85652 RBC SED RATE AUTOMATED: CPT

## 2023-04-05 PROCEDURE — 84450 TRANSFERASE (AST) (SGOT): CPT

## 2023-04-05 PROCEDURE — 82040 ASSAY OF SERUM ALBUMIN: CPT

## 2023-04-05 PROCEDURE — 36415 COLL VENOUS BLD VENIPUNCTURE: CPT

## 2023-04-05 PROCEDURE — 84460 ALANINE AMINO (ALT) (SGPT): CPT

## 2023-04-05 PROCEDURE — 82565 ASSAY OF CREATININE: CPT

## 2023-04-05 PROCEDURE — 86140 C-REACTIVE PROTEIN: CPT

## 2023-04-13 ENCOUNTER — OFFICE VISIT (OUTPATIENT)
Dept: RHEUMATOLOGY | Facility: CLINIC | Age: 73
End: 2023-04-13

## 2023-04-13 VITALS
DIASTOLIC BLOOD PRESSURE: 75 MMHG | SYSTOLIC BLOOD PRESSURE: 145 MMHG | WEIGHT: 191 LBS | HEART RATE: 62 BPM | HEIGHT: 61 IN | BODY MASS INDEX: 36.06 KG/M2

## 2023-04-13 DIAGNOSIS — Z51.81 MEDICATION MONITORING ENCOUNTER: ICD-10-CM

## 2023-04-13 DIAGNOSIS — M06.09 RHEUMATOID ARTHRITIS OF MULTIPLE SITES WITH NEGATIVE RHEUMATOID FACTOR (HCC): ICD-10-CM

## 2023-04-13 DIAGNOSIS — M35.3 PMR (POLYMYALGIA RHEUMATICA) (HCC): Primary | ICD-10-CM

## 2023-04-13 PROCEDURE — 99214 OFFICE O/P EST MOD 30 MIN: CPT | Performed by: INTERNAL MEDICINE

## 2023-04-13 RX ORDER — PREDNISONE 1 MG/1
5 TABLET ORAL DAILY
Qty: 90 TABLET | Refills: 0 | Status: SHIPPED | OUTPATIENT
Start: 2023-04-13

## 2023-04-13 RX ORDER — FOLIC ACID 1 MG/1
1 TABLET ORAL DAILY
Qty: 90 TABLET | Refills: 2 | Status: SHIPPED | OUTPATIENT
Start: 2023-04-13

## 2023-04-13 NOTE — PATIENT INSTRUCTIONS
You were seen today for RA  Continue methotrexate 8 pills weekly and folic acid daily  Continue pred 5 mg daily  Blood work July  Can see me in July

## 2023-06-26 ENCOUNTER — LAB ENCOUNTER (OUTPATIENT)
Dept: LAB | Facility: HOSPITAL | Age: 73
End: 2023-06-26
Attending: INTERNAL MEDICINE
Payer: MEDICARE

## 2023-06-26 DIAGNOSIS — M06.09 RHEUMATOID ARTHRITIS OF MULTIPLE SITES WITH NEGATIVE RHEUMATOID FACTOR (HCC): ICD-10-CM

## 2023-06-26 DIAGNOSIS — M35.3 PMR (POLYMYALGIA RHEUMATICA) (HCC): ICD-10-CM

## 2023-06-26 DIAGNOSIS — Z51.81 MEDICATION MONITORING ENCOUNTER: ICD-10-CM

## 2023-06-26 LAB
ALBUMIN SERPL-MCNC: 3.7 G/DL (ref 3.4–5)
ALT SERPL-CCNC: 33 U/L
AST SERPL-CCNC: 23 U/L (ref 15–37)
BASOPHILS # BLD AUTO: 0.11 X10(3) UL (ref 0–0.2)
BASOPHILS NFR BLD AUTO: 1 %
CREAT BLD-MCNC: 0.66 MG/DL
CRP SERPL-MCNC: 1.11 MG/DL (ref ?–0.3)
DEPRECATED RDW RBC AUTO: 46.4 FL (ref 35.1–46.3)
EOSINOPHIL # BLD AUTO: 0.09 X10(3) UL (ref 0–0.7)
EOSINOPHIL NFR BLD AUTO: 0.8 %
ERYTHROCYTE [DISTWIDTH] IN BLOOD BY AUTOMATED COUNT: 13.2 % (ref 11–15)
ERYTHROCYTE [SEDIMENTATION RATE] IN BLOOD: 12 MM/HR
GFR SERPLBLD BASED ON 1.73 SQ M-ARVRAT: 94 ML/MIN/1.73M2 (ref 60–?)
HCT VFR BLD AUTO: 41.8 %
HGB BLD-MCNC: 13.6 G/DL
IMM GRANULOCYTES # BLD AUTO: 0.04 X10(3) UL (ref 0–1)
IMM GRANULOCYTES NFR BLD: 0.4 %
LYMPHOCYTES # BLD AUTO: 2.5 X10(3) UL (ref 1–4)
LYMPHOCYTES NFR BLD AUTO: 22.1 %
MCH RBC QN AUTO: 31.4 PG (ref 26–34)
MCHC RBC AUTO-ENTMCNC: 32.5 G/DL (ref 31–37)
MCV RBC AUTO: 96.5 FL
MONOCYTES # BLD AUTO: 0.8 X10(3) UL (ref 0.1–1)
MONOCYTES NFR BLD AUTO: 7.1 %
NEUTROPHILS # BLD AUTO: 7.78 X10 (3) UL (ref 1.5–7.7)
NEUTROPHILS # BLD AUTO: 7.78 X10(3) UL (ref 1.5–7.7)
NEUTROPHILS NFR BLD AUTO: 68.6 %
PLATELET # BLD AUTO: 369 10(3)UL (ref 150–450)
RBC # BLD AUTO: 4.33 X10(6)UL
WBC # BLD AUTO: 11.3 X10(3) UL (ref 4–11)

## 2023-06-26 PROCEDURE — 86140 C-REACTIVE PROTEIN: CPT

## 2023-06-26 PROCEDURE — 84460 ALANINE AMINO (ALT) (SGPT): CPT

## 2023-06-26 PROCEDURE — 85652 RBC SED RATE AUTOMATED: CPT

## 2023-06-26 PROCEDURE — 36415 COLL VENOUS BLD VENIPUNCTURE: CPT

## 2023-06-26 PROCEDURE — 85025 COMPLETE CBC W/AUTO DIFF WBC: CPT

## 2023-06-26 PROCEDURE — 84450 TRANSFERASE (AST) (SGOT): CPT

## 2023-06-26 PROCEDURE — 82565 ASSAY OF CREATININE: CPT

## 2023-06-26 PROCEDURE — 82040 ASSAY OF SERUM ALBUMIN: CPT

## 2023-07-20 ENCOUNTER — OFFICE VISIT (OUTPATIENT)
Dept: RHEUMATOLOGY | Facility: CLINIC | Age: 73
End: 2023-07-20

## 2023-07-20 VITALS
BODY MASS INDEX: 36.06 KG/M2 | DIASTOLIC BLOOD PRESSURE: 57 MMHG | WEIGHT: 191 LBS | HEART RATE: 67 BPM | SYSTOLIC BLOOD PRESSURE: 126 MMHG | HEIGHT: 61 IN

## 2023-07-20 DIAGNOSIS — M35.3 PMR (POLYMYALGIA RHEUMATICA) (HCC): Primary | ICD-10-CM

## 2023-07-20 DIAGNOSIS — M06.09 RHEUMATOID ARTHRITIS OF MULTIPLE SITES WITH NEGATIVE RHEUMATOID FACTOR (HCC): ICD-10-CM

## 2023-07-20 DIAGNOSIS — Z51.81 MEDICATION MONITORING ENCOUNTER: ICD-10-CM

## 2023-07-20 PROCEDURE — 99214 OFFICE O/P EST MOD 30 MIN: CPT | Performed by: INTERNAL MEDICINE

## 2023-07-20 RX ORDER — PREDNISONE 5 MG/1
5 TABLET ORAL DAILY
Qty: 90 TABLET | Refills: 1 | Status: SHIPPED | OUTPATIENT
Start: 2023-07-20

## 2023-07-20 RX ORDER — FOLIC ACID 1 MG/1
1 TABLET ORAL DAILY
Qty: 90 TABLET | Refills: 2 | Status: SHIPPED | OUTPATIENT
Start: 2023-07-20

## 2023-07-20 RX ORDER — METHOTREXATE 2.5 MG/1
20 TABLET ORAL WEEKLY
Qty: 104 TABLET | Refills: 1 | Status: SHIPPED | OUTPATIENT
Start: 2023-07-20

## 2023-07-20 NOTE — PROGRESS NOTES
Real Luna is a 72year old female. HPI:   Patient presents with:  PMR  Fatigue      I had the pleasure of seeing Real Luna on 7/20/2023 for follow up PMR likely progressed to RA/spondylarthritis. Current Medications:  Prednisone 5 mg daily- started mid Aug 2021  Methotrexate 8 pills weekly and FA daily- started 6/24/2022  Blood work:  CRP 3.0 mg/dL, ESR 22 (8/2021), now normal  Normal CBC and CMP  Neg TAVON, RF, CCP, HLA-B27, CK    Interval History: This is a 72 yo F with hx of HLD, Hashimoto's/Hypothyroid presents with bilateral shoulder stiffness. She received her first Covid vaccine back in March and then developed bilateral shoulder pain and stiffness. She states that she was not able to lift her arms. She is only able to move from her elbows down to her hands. Symptoms lasted for about 17 days. She was good for 2 days and then received her second Covid vaccine. Again developed bilateral shoulder stiffness. Symptoms slowly got better and she was doing well up until July. She states it was hard for her to brush her hair, lift her arms or even take off her close due to the stiffness in her shoulders. Blood work was done and she was found to have elevated CRP of 30. She was started on prednisone 20 mg daily and her symptoms improved significantly. She was dropped down by every week. When she went down to 12.5 mg daily her symptoms came back. She was then increased to 15 mg daily and has been on that since September 10. Still has some stiffness in her shoulders but not as bad compared to when it started. Denies any other joint pain or swelling. Denies any rash, psoriasis, GI or  symptoms. She reports fatigue. Denies any fever chills, night sweats or weight loss. Denies any temporal pain, headache or jaw pain or blurry vision.     11/4/2021:  Presents for f/u of PMR  Now on prednisone 12.5 mg daily for the past week  Continues to have some shoulder stiffness mostly in her right shoulder. She has full range of motion though  No stiffness in her thighs or hip region  She reports fatigue being a big component. She feels tired all the time  She states that her sleep is not adequate. She wakes up multiple times at night. At times will watch TV. She also has gained some weight, about 6 pounds in the past 1.5 months  Denies any other joint pain or swelling, denies any GCA symptoms    1/20/2022:  Presents for f/u of PMR  No down to prednisone 6 mg daily  Recent blood work shows CRP 1.15  Minimal pain in R shoulder but has FROM  No joint swelling  No GCA symptoms     4/25/2022:  Presents for f/u of PMR  No down to prednisone 1 mg daily  Recent blood work shows CRP 1.94  She was going to have prednisone by half milligram every week. About 3 weeks ago developed tendinitis in her right ankle. Her PCP gave her antibiotics thinking that there could have been infection. She was seen by podiatrist and recommended to ice the tendon. At times she will have some right knee pain. Now having some pain in her left upper chest area near the collarbone. Shoulder still feels stiff. Hips feel stiff. Overall reports a lot of stiffness and muscle pain  Reports a lot of fatigue  No GCA symptoms    5/19/2022:  Presents for f/u of PMR likely progressed RA/spondylarthritis  Blood work still shows slightly elevated CRP of 1.94. Negative RF, CRP and HLA-B27  Now reports worsening pain in her left collarbone, left shoulder. Also having a lot of pain and swelling in her right Achilles tendon. Due to the symptoms her prednisone was increased, now on 10 mg daily  Now left collar bone pain is better  Continues to have some pain and swelling in R achilles, will be doing PT  No GCA symptoms     6/9/2022:  Presents for f/u of PMR likely progressed RA/spondylarthritis  Blood work still shows slightly elevated CRP of 1.94.   Negative RF, CRP and HLA-B27  Has now been having more pain involving her left collarbone, shoulder and even her right Achilles tendon. Continues to have swelling in R achilles tendon. Doing PT but not helping, it was worse over the weekend. L collar bone and shoulder are doing well  On prednisone 9 mg daily  Having some irration in the eyes, feels inflamed, no blurry vision, no GCA symptoms  Having some acid issues in the stomach  Worsening fatigue     8/3/2022:  Presents for f/u of PMR likely progressed RA/spondylarthritis  Blood work in April shows slightly elevated CRP of 1.94. Negative RF, CRP and HLA-B27  Now down to prednisone 8 mg daily  Also on methotrexate 6 pills weekly and FA daily, on it for about 6 weeks now  Last week felt good, less stiffness and fatigue  But this week had some stiffness in hips   Some pain and stiffness in knees, but not bad, no swelling  Fatigue has improved a little  R achilles tendon pain and swelling has improved  Also having some indigestion in upper abdomen, feels bloating. Normals stools, no nausea or vomiting. Taking pepcid and gasX as needed  Also having a lot of dry eyes, saw eye doctor and started systane twice a day. Its helping the dry eyes  Also has chronic lower back pain      11/7/0222:  Presents for f/u of PMR likely progressed RA/spondylarthritis  Blood work in April shows slightly elevated CRP of 1.94. Negative RF, CRP and HLA-B27  Now down to prednisone 4 mg daily  Also on methotrexate 6 pills weekly and FA daily  Recent blood work shows normal CBC, CRP slightly elevated at 1.28  Felt really good for 3 weeks in October, was on prednisone 5 mg daily as that time  Joint pain has improved significantly, still has stiffness though but not pain in the joints, no swelling in the joints   Able to raise both shoulders. No achilles pain or swelling  Also having a lot of fatigue for the last week    1/12/2023:  Presents for f/u of PMR likely progressed RA/spondylarthritis  Blood work in April shows slightly elevated CRP of 1.94.   Negative RF, CRP and HLA-B27  He went down to prednisone 2 mg daily on 12/25/2022 and had worsening pain in her hips and shoulders. She would hardly lift her arms, wash her face even walk her dog due to the symptoms. She went back up to 4 mg daily and her symptoms improved significantly  Also on methotrexate 6 pills weekly and folic acid every day  Blood work showed normal ESR but CRP was increased 2.32. Back in August it was normal.  When on methotrexate and prednisone 5 mg and was doing really well. Even on 3 mg and 4 mg having some symptoms. Has had a lot of stress in the last 2 mos also. Having stiffness in the hips, hard to walk. Also having some pain in r shoulder. 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  Left hand feels like its swollen at times  Having more dry eyes, using otc eye drops now  Shoulder pain has now gone, some stiffness in hips now   Does not have an heel pain anymore   Blood work showing elevated CRP 1.12  Bone density was done March and normal    7/20/2023:  Presents for f/u of PMR likely progressed RA/spondylarthritis  Currently on methotrexate 8 pills weekly and prednisone 5 mg daily  Recent blood work shows mildly elevated CRP 1.11  At times will have fatigue  Has had some hip pain when sleeping but resolved  No stiffness in the morning but has stiffness after prolonged siting or after car ride  Overall doing better      HISTORY:  No past medical history on file. Social Hx Reviewed   Family Hx Reviewed     Medications (Active prior to today's visit):  Current Outpatient Medications   Medication Sig Dispense Refill    predniSONE 5 MG Oral Tab Take 1 tablet (5 mg total) by mouth daily. 90 tablet 0    folic acid 1 MG Oral Tab Take 1 tablet (1 mg total) by mouth daily. 90 tablet 2    methotrexate 2.5 MG Oral Tab Take 8 tablets (20 mg total) by mouth once a week. 104 tablet 1    levothyroxine 112 MCG Oral Tab Take 1 tablet (112 mcg total) by mouth before breakfast.      Fexofenadine HCl (ALLEGRA ALLERGY OR) Take by mouth as needed. ascorbic acid 1000 MG Oral Tab Take 1 tablet (1,000 mg total) by mouth daily. omega-3 fatty acids 1000 MG Oral Cap Take 3,000 mg by mouth daily. magnesium 250 MG Oral Tab Take 1 tablet (250 mg total) by mouth. Fluticasone Propionate 50 MCG/ACT Nasal Suspension Flonase 50 mcg/actuation nasal spray,suspension   Spray 2 sprays every day by nasal route for 30 days. Multiple Vitamins-Minerals (CENTRUM SILVER ULTRA WOMENS) Oral Tab Take by mouth. B Complex Vitamins (VITAMIN B COMPLEX OR) Inject as directed. Cholecalciferol (VITAMIN D) 50 MCG (2000 UT) Oral Tab Take by mouth 2 (two) times a day. Menaquinone-7 (VITAMIN K2 OR) Take by mouth. Calcium Carbonate-Vit D-Min (CALTRATE 600+D PLUS OR) Take by mouth. Krill Oil 500 MG Oral Cap Take by mouth. Probiotic Product ([place]) Take by mouth. Lactobacillus (ACIDOPHOLUS OR) Take by mouth. GLUCOSAMINE-CHONDROITIN OR Take by mouth.       Albuterol Sulfate  (90 Base) MCG/ACT Inhalation Aero Soln albuterol sulfate HFA 90 mcg/actuation aerosol inhaler      predniSONE 1 MG Oral Tab Taper down as directed, 4 mg daily for a month then decrease by 1 mg every month 200 tablet 0    predniSONE 5 MG Oral Tab Take 1 tablet (5 mg total) by mouth daily. 90 tablet 0     .cmed  Allergies:    Penicillins             RASH  Sulfa Antibiotics       RASH      ROS:   All other ROS are negative. PHYSICAL EXAM:   GEN: AAOx3, NAD  HEENT: EOMI, PERRLA, no injection or icterus, oral mucosa moist, no oral lesions. No lymphadenopathy. No facial rash  CVS: RRR, no murmurs rubs or gallops. Equal 2+ distal pulses. LUNGS: CTAB, no increased work of breathing  ABDOMEN:  soft NT/ND, +BS, no HSM  SKIN: No rashes or skin lesions. No nail findings  MSK:  Cervical spine: FROM  Hands: no synovitis in DIP, PIP and MCP, strong full fists  Wrist: FROM, no pain or swelling or warmth on palpation  Elbow: FROM, no pain or swelling or warmth on palpation  Shoulders: R shoulder has FROM  Hip: normal log roll, no lateral hip pain, RUBIA test negative b/l  Knees: FROM, no warmth or effusion present. No pain with ROM. Ankles: R achilles swelling improved  Feet: no pain with MTP squeeze, no toe swelling or pain or warmth on palpation with FROM  Spine: no lumbar or sacral pain on palpation. NEURO: Cranial nerves II-XII intact grossly. 5/5 strength throughout in both upper and lower extremities, sensation intact. PSYCH: normal mood       LABS:     Component      Latest Ref Rng & Units 9/15/2021   Quantiferon TB NIL      IU/mL 0.03   Quantiferon-TB1 Minus NIL      IU/mL 0.08   Quantiferon-TB2 Minus NIL      IU/mL 0.02   Quantiferon TB Mitogen minus NIL      IU/mL >10.00   QTB. RESULT      Negative Negative   HBSAg Screen      Nonreactive  Nonreactive   Hbsag Screen Index       <0.10   HEPATITIS B SURFACE AB QUAL      Nonreactive  Nonreactive   HEPATITIS B SURFACE AB QUANT      mIU/mL <3.10   C-Citrullinated Peptide IgG AB      0.0 - 6.9 U/mL 0.5   RHEUMATOID FACTOR      <15 IU/mL <10   HEPATITIS B CORE AB, TOTAL      Nonreactive  Nonreactive   GLUCOSE-6-PHOSPHATE DEHYDROGEN      9.9 - 16.6 U/g Hb 11.7   HCV AB      Nonreactive  Nonreactive Imaging:     None    ASSESSMENT/PLAN:     B/L shoulder stiffness 2/2 PMR now likely progressed to RA/spondylarthritis- stable   - Symptoms are classic for PMR. Bilateral shoulder stiffness.   Elevated CRP and response to prednisone.    - No giant cell arteritis symptoms  - On methotrexate 8 pills weekly and folic acid daily and predisone 5 mg daily, wants to dry do go down to methotrexate 7 pills weekly  - Blood work reviewed with patient    Right Achilles tendinitis likely 2/2 Above- improved  - continue above treatment     Pt will f/u in 3 mos     Jose Cat MD  7/20/2023  12:40 PM

## 2023-10-18 ENCOUNTER — LAB ENCOUNTER (OUTPATIENT)
Dept: LAB | Facility: HOSPITAL | Age: 73
End: 2023-10-18
Attending: INTERNAL MEDICINE
Payer: MEDICARE

## 2023-10-18 DIAGNOSIS — M35.3 PMR (POLYMYALGIA RHEUMATICA) (HCC): ICD-10-CM

## 2023-10-18 DIAGNOSIS — Z51.81 MEDICATION MONITORING ENCOUNTER: ICD-10-CM

## 2023-10-18 DIAGNOSIS — M06.09 RHEUMATOID ARTHRITIS OF MULTIPLE SITES WITH NEGATIVE RHEUMATOID FACTOR (HCC): ICD-10-CM

## 2023-10-18 LAB
ALBUMIN SERPL-MCNC: 3.8 G/DL (ref 3.4–5)
ALT SERPL-CCNC: 37 U/L
AST SERPL-CCNC: 16 U/L (ref 15–37)
BASOPHILS # BLD AUTO: 0.1 X10(3) UL (ref 0–0.2)
BASOPHILS NFR BLD AUTO: 0.9 %
CREAT BLD-MCNC: 0.72 MG/DL
CRP SERPL-MCNC: 0.89 MG/DL (ref ?–0.3)
DEPRECATED RDW RBC AUTO: 46.3 FL (ref 35.1–46.3)
EGFRCR SERPLBLD CKD-EPI 2021: 89 ML/MIN/1.73M2 (ref 60–?)
EOSINOPHIL # BLD AUTO: 0.09 X10(3) UL (ref 0–0.7)
EOSINOPHIL NFR BLD AUTO: 0.8 %
ERYTHROCYTE [DISTWIDTH] IN BLOOD BY AUTOMATED COUNT: 13.4 % (ref 11–15)
ERYTHROCYTE [SEDIMENTATION RATE] IN BLOOD: 12 MM/HR
HCT VFR BLD AUTO: 41.6 %
HGB BLD-MCNC: 14 G/DL
IMM GRANULOCYTES # BLD AUTO: 0.04 X10(3) UL (ref 0–1)
IMM GRANULOCYTES NFR BLD: 0.3 %
LYMPHOCYTES # BLD AUTO: 2.89 X10(3) UL (ref 1–4)
LYMPHOCYTES NFR BLD AUTO: 25.2 %
MCH RBC QN AUTO: 31.8 PG (ref 26–34)
MCHC RBC AUTO-ENTMCNC: 33.7 G/DL (ref 31–37)
MCV RBC AUTO: 94.5 FL
MONOCYTES # BLD AUTO: 0.81 X10(3) UL (ref 0.1–1)
MONOCYTES NFR BLD AUTO: 7.1 %
NEUTROPHILS # BLD AUTO: 7.54 X10 (3) UL (ref 1.5–7.7)
NEUTROPHILS # BLD AUTO: 7.54 X10(3) UL (ref 1.5–7.7)
NEUTROPHILS NFR BLD AUTO: 65.7 %
PLATELET # BLD AUTO: 344 10(3)UL (ref 150–450)
RBC # BLD AUTO: 4.4 X10(6)UL
WBC # BLD AUTO: 11.5 X10(3) UL (ref 4–11)

## 2023-10-18 PROCEDURE — 82040 ASSAY OF SERUM ALBUMIN: CPT

## 2023-10-18 PROCEDURE — 85652 RBC SED RATE AUTOMATED: CPT

## 2023-10-18 PROCEDURE — 82565 ASSAY OF CREATININE: CPT

## 2023-10-18 PROCEDURE — 84460 ALANINE AMINO (ALT) (SGPT): CPT

## 2023-10-18 PROCEDURE — 36415 COLL VENOUS BLD VENIPUNCTURE: CPT

## 2023-10-18 PROCEDURE — 86140 C-REACTIVE PROTEIN: CPT

## 2023-10-18 PROCEDURE — 85025 COMPLETE CBC W/AUTO DIFF WBC: CPT

## 2023-10-18 PROCEDURE — 84450 TRANSFERASE (AST) (SGOT): CPT

## 2023-10-30 ENCOUNTER — OFFICE VISIT (OUTPATIENT)
Dept: RHEUMATOLOGY | Facility: CLINIC | Age: 73
End: 2023-10-30

## 2023-10-30 VITALS
SYSTOLIC BLOOD PRESSURE: 133 MMHG | DIASTOLIC BLOOD PRESSURE: 80 MMHG | BODY MASS INDEX: 34.93 KG/M2 | HEIGHT: 61 IN | HEART RATE: 67 BPM | WEIGHT: 185 LBS

## 2023-10-30 DIAGNOSIS — Z51.81 MEDICATION MONITORING ENCOUNTER: ICD-10-CM

## 2023-10-30 DIAGNOSIS — M35.3 PMR (POLYMYALGIA RHEUMATICA) (HCC): Primary | ICD-10-CM

## 2023-10-30 DIAGNOSIS — M06.09 RHEUMATOID ARTHRITIS OF MULTIPLE SITES WITH NEGATIVE RHEUMATOID FACTOR (HCC): ICD-10-CM

## 2023-10-30 PROCEDURE — 99214 OFFICE O/P EST MOD 30 MIN: CPT | Performed by: INTERNAL MEDICINE

## 2023-10-30 NOTE — PROGRESS NOTES
Jackeline Kilgore is a 72year old female. 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 Current Medications:  Prednisone 5 mg daily- started mid Aug 2021  Methotrexate 8 pills weekly and FA daily- started 6/24/2022  Blood work:  CRP 3.0 mg/dL, ESR 22 (8/2021), now normal  Normal CBC and CMP  Neg TAVON, RF, CCP, HLA-B27, CK    Interval History: This is a 72 yo F with hx of HLD, Hashimoto's/Hypothyroid presents with bilateral shoulder stiffness. She received her first Covid vaccine back in March and then developed bilateral shoulder pain and stiffness. She states that she was not able to lift her arms. She is only able to move from her elbows down to her hands. Symptoms lasted for about 17 days. She was good for 2 days and then received her second Covid vaccine. Again developed bilateral shoulder stiffness. Symptoms slowly got better and she was doing well up until July. She states it was hard for her to brush her hair, lift her arms or even take off her close due to the stiffness in her shoulders. Blood work was done and she was found to have elevated CRP of 30. She was started on prednisone 20 mg daily and her symptoms improved significantly. She was dropped down by every week. When she went down to 12.5 mg daily her symptoms came back. She was then increased to 15 mg daily and has been on that since September 10. Still has some stiffness in her shoulders but not as bad compared to when it started. Denies any other joint pain or swelling. Denies any rash, psoriasis, GI or  symptoms. She reports fatigue. Denies any fever chills, night sweats or weight loss. Denies any temporal pain, headache or jaw pain or blurry vision.     11/4/2021:  Presents for f/u of PMR  Now on prednisone 12.5 mg daily for the past week  Continues to have some shoulder stiffness mostly in her right shoulder. She has full range of motion though  No stiffness in her thighs or hip region  She reports fatigue being a big component. She feels tired all the time  She states that her sleep is not adequate. She wakes up multiple times at night. At times will watch TV. She also has gained some weight, about 6 pounds in the past 1.5 months  Denies any other joint pain or swelling, denies any GCA symptoms    1/20/2022:  Presents for f/u of PMR  No down to prednisone 6 mg daily  Recent blood work shows CRP 1.15  Minimal pain in R shoulder but has FROM  No joint swelling  No GCA symptoms     4/25/2022:  Presents for f/u of PMR  No down to prednisone 1 mg daily  Recent blood work shows CRP 1.94  She was going to have prednisone by half milligram every week. About 3 weeks ago developed tendinitis in her right ankle. Her PCP gave her antibiotics thinking that there could have been infection. She was seen by podiatrist and recommended to ice the tendon. At times she will have some right knee pain. Now having some pain in her left upper chest area near the collarbone. Shoulder still feels stiff. Hips feel stiff. Overall reports a lot of stiffness and muscle pain  Reports a lot of fatigue  No GCA symptoms    5/19/2022:  Presents for f/u of PMR likely progressed RA/spondylarthritis  Blood work still shows slightly elevated CRP of 1.94. Negative RF, CRP and HLA-B27  Now reports worsening pain in her left collarbone, left shoulder. Also having a lot of pain and swelling in her right Achilles tendon. Due to the symptoms her prednisone was increased, now on 10 mg daily  Now left collar bone pain is better  Continues to have some pain and swelling in R achilles, will be doing PT  No GCA symptoms     6/9/2022:  Presents for f/u of PMR likely progressed RA/spondylarthritis  Blood work still shows slightly elevated CRP of 1.94.   Negative RF, CRP and HLA-B27  Has now been having more pain involving her left collarbone, shoulder and even her right Achilles tendon. Continues to have swelling in R achilles tendon. Doing PT but not helping, it was worse over the weekend. L collar bone and shoulder are doing well  On prednisone 9 mg daily  Having some irration in the eyes, feels inflamed, no blurry vision, no GCA symptoms  Having some acid issues in the stomach  Worsening fatigue     8/3/2022:  Presents for f/u of PMR likely progressed RA/spondylarthritis  Blood work in April shows slightly elevated CRP of 1.94. Negative RF, CRP and HLA-B27  Now down to prednisone 8 mg daily  Also on methotrexate 6 pills weekly and FA daily, on it for about 6 weeks now  Last week felt good, less stiffness and fatigue  But this week had some stiffness in hips   Some pain and stiffness in knees, but not bad, no swelling  Fatigue has improved a little  R achilles tendon pain and swelling has improved  Also having some indigestion in upper abdomen, feels bloating. Normals stools, no nausea or vomiting. Taking pepcid and gasX as needed  Also having a lot of dry eyes, saw eye doctor and started systane twice a day. Its helping the dry eyes  Also has chronic lower back pain      11/7/0222:  Presents for f/u of PMR likely progressed RA/spondylarthritis  Blood work in April shows slightly elevated CRP of 1.94. Negative RF, CRP and HLA-B27  Now down to prednisone 4 mg daily  Also on methotrexate 6 pills weekly and FA daily  Recent blood work shows normal CBC, CRP slightly elevated at 1.28  Felt really good for 3 weeks in October, was on prednisone 5 mg daily as that time  Joint pain has improved significantly, still has stiffness though but not pain in the joints, no swelling in the joints   Able to raise both shoulders.  No achilles pain or swelling  Also having a lot of fatigue for the last week    1/12/2023:  Presents for f/u of PMR likely progressed RA/spondylarthritis  Blood work in April shows slightly elevated CRP of 1.94.  Negative RF, CRP and HLA-B27  He went down to prednisone 2 mg daily on 12/25/2022 and had worsening pain in her hips and shoulders. She would hardly lift her arms, wash her face even walk her dog due to the symptoms. She went back up to 4 mg daily and her symptoms improved significantly  Also on methotrexate 6 pills weekly and folic acid every day  Blood work showed normal ESR but CRP was increased 2.32. Back in August it was normal.  When on methotrexate and prednisone 5 mg and was doing really well. Even on 3 mg and 4 mg having some symptoms. Has had a lot of stress in the last 2 mos also. Having stiffness in the hips, hard to walk. Also having some pain in r shoulder. 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  Left hand feels like its swollen at times  Having more dry eyes, using otc eye drops now  Shoulder pain has now gone, some stiffness in hips now   Does not have an heel pain anymore   Blood work showing elevated CRP 1.12  Bone density was done March and normal    7/20/2023:  Presents for f/u of PMR likely progressed RA/spondylarthritis  Currently on methotrexate 8 pills weekly and prednisone 5 mg daily  Recent blood work shows mildly elevated CRP 1.11  At times will have fatigue  Has had some hip pain when sleeping but resolved  No stiffness in the morning but has stiffness after prolonged siting or after car ride  Overall doing better    10/30/2023:  Presents for f/u of PMR likely progressed RA/spondylarthritis  Currently on methotrexate 8 pills weekly and prednisone 5 mg daily  Recent blood work shows normal inflammatory markers  Starting in Aug 2023 due to some left ear pain and headache and was given doxycycline and now having ringing in both ears since then. 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 When laying on the right side can have some pain in right hp  Hands are stable  Some mild right knee pain  Achilles pain has been stable  Shoulders are good            HISTORY:  No past medical history on file. Social Hx Reviewed   Family Hx Reviewed     Medications (Active prior to today's visit):  Current Outpatient Medications   Medication Sig Dispense Refill    predniSONE 5 MG Oral Tab Take 1 tablet (5 mg total) by mouth daily. 90 tablet 1    methotrexate 2.5 MG Oral Tab Take 8 tablets (20 mg total) by mouth once a week. 343 tablet 1    folic acid 1 MG Oral Tab Take 1 tablet (1 mg total) by mouth daily. 90 tablet 2    predniSONE 1 MG Oral Tab Taper down as directed, 4 mg daily for a month then decrease by 1 mg every month 200 tablet 0    predniSONE 5 MG Oral Tab Take 1 tablet (5 mg total) by mouth daily. 90 tablet 0    levothyroxine 112 MCG Oral Tab Take 1 tablet (112 mcg total) by mouth before breakfast.      Fexofenadine HCl (ALLEGRA ALLERGY OR) Take by mouth as needed. ascorbic acid 1000 MG Oral Tab Take 1 tablet (1,000 mg total) by mouth daily.       omega-3 fatty acids 1000 MG Oral Cap Take 3,000 mg by mouth daily.      magnesium 250 MG Oral Tab Take 1 tablet (250 mg total) by mouth. Fluticasone Propionate 50 MCG/ACT Nasal Suspension Flonase 50 mcg/actuation nasal spray,suspension   Spray 2 sprays every day by nasal route for 30 days. Multiple Vitamins-Minerals (CENTRUM SILVER ULTRA WOMENS) Oral Tab Take by mouth. B Complex Vitamins (VITAMIN B COMPLEX OR) Inject as directed. Cholecalciferol (VITAMIN D) 50 MCG (2000 UT) Oral Tab Take by mouth 2 (two) times a day. Menaquinone-7 (VITAMIN K2 OR) Take by mouth. Calcium Carbonate-Vit D-Min (CALTRATE 600+D PLUS OR) Take by mouth. Krill Oil 500 MG Oral Cap Take by mouth. Probiotic Product ([place]) Take by mouth. Lactobacillus (ACIDOPHOLUS OR) Take by mouth. GLUCOSAMINE-CHONDROITIN OR Take by mouth. Albuterol Sulfate  (90 Base) MCG/ACT Inhalation Aero Soln albuterol sulfate HFA 90 mcg/actuation aerosol inhaler       . cmed  Allergies:    Penicillins             RASH  Sulfa Antibiotics       RASH      ROS:   All other ROS are negative. PHYSICAL EXAM:   GEN: AAOx3, NAD  HEENT: EOMI, PERRLA, no injection or icterus, oral mucosa moist, no oral lesions. No lymphadenopathy. No facial rash  CVS: RRR, no murmurs rubs or gallops. Equal 2+ distal pulses. LUNGS: CTAB, no increased work of breathing  ABDOMEN:  soft NT/ND, +BS, no HSM  SKIN: No rashes or skin lesions. No nail findings  MSK:  Cervical spine: FROM  Hands: no synovitis in DIP, PIP and MCP, strong full fists  Wrist: FROM, no pain or swelling or warmth on palpation  Elbow: FROM, no pain or swelling or warmth on palpation  Shoulders: R shoulder has FROM  Hip: normal log roll, no lateral hip pain, RUBIA test negative b/l  Knees: FROM, no warmth or effusion present. No pain with ROM.    Ankles: R achilles swelling improved  Feet: no pain with MTP squeeze, no toe swelling or pain or warmth on palpation with FROM  Spine: no lumbar or sacral pain on palpation. NEURO: Cranial nerves II-XII intact grossly. 5/5 strength throughout in both upper and lower extremities, sensation intact. PSYCH: normal mood       LABS:     Component      Latest Ref Rng & Units 9/15/2021   Quantiferon TB NIL      IU/mL 0.03   Quantiferon-TB1 Minus NIL      IU/mL 0.08   Quantiferon-TB2 Minus NIL      IU/mL 0.02   Quantiferon TB Mitogen minus NIL      IU/mL >10.00   QTB. RESULT      Negative Negative   HBSAg Screen      Nonreactive  Nonreactive   Hbsag Screen Index       <0.10   HEPATITIS B SURFACE AB QUAL      Nonreactive  Nonreactive   HEPATITIS B SURFACE AB QUANT      mIU/mL <3.10   C-Citrullinated Peptide IgG AB      0.0 - 6.9 U/mL 0.5   RHEUMATOID FACTOR      <15 IU/mL <10   HEPATITIS B CORE AB, TOTAL      Nonreactive  Nonreactive   GLUCOSE-6-PHOSPHATE DEHYDROGEN      9.9 - 16.6 U/g Hb 11.7   HCV AB      Nonreactive  Nonreactive     Imaging:     None    ASSESSMENT/PLAN:     B/L shoulder stiffness 2/2 PMR now likely progressed to RA/spondylarthritis- stable   - Symptoms are classic for PMR. Bilateral shoulder stiffness.   Elevated CRP and response to prednisone.    - No giant cell arteritis symptoms  - On methotrexate 8 pills weekly and folic acid daily and predisone 5 mg daily  - Blood work reviewed with patient, will continue to monitor every 3 mos     Tinnitus  - started in Aug 2023, was given Abx doxycyline and started after taking Abx  - advised patient that its not from the methotrexate  - she will see her PCP next month      Right Achilles tendinitis likely 2/2 Above- improved  - continue above treatment     Pt will f/u in 3 mos     Ifeoma Rock MD  10/30/2023  2:40 PM

## 2023-10-30 NOTE — PATIENT INSTRUCTIONS
You were seen for PMR, RA  Continue methotrexate and prednisone   Repeat blood work in Feb  See me Feb or March

## 2024-01-08 RX ORDER — PREDNISONE 5 MG/1
5 TABLET ORAL DAILY
Qty: 90 TABLET | Refills: 0 | Status: SHIPPED | OUTPATIENT
Start: 2024-01-08

## 2024-01-08 NOTE — TELEPHONE ENCOUNTER
LOV: 10/30/2023  Future Appointments   Date Time Provider Department Center   3/4/2024 12:00 PM Vida Cristina MD [facility]     Labs:    Component      Latest Ref Rng 10/18/2023   WBC      4.0 - 11.0 x10(3) uL 11.5 (H)    RBC      3.80 - 5.30 x10(6)uL 4.40    Hemoglobin      12.0 - 16.0 g/dL 14.0    Hematocrit      35.0 - 48.0 % 41.6    MCV      80.0 - 100.0 fL 94.5    MCH      26.0 - 34.0 pg 31.8    MCHC      31.0 - 37.0 g/dL 33.7    RDW-SD      35.1 - 46.3 fL 46.3    RDW      11.0 - 15.0 % 13.4    Platelet Count      150.0 - 450.0 10(3)uL 344.0    Prelim Neutrophil Abs      1.50 - 7.70 x10 (3) uL 7.54    Neutrophils Absolute      1.50 - 7.70 x10(3) uL 7.54    Lymphocytes Absolute      1.00 - 4.00 x10(3) uL 2.89    Monocytes Absolute      0.10 - 1.00 x10(3) uL 0.81    Eosinophils Absolute      0.00 - 0.70 x10(3) uL 0.09    Basophils Absolute      0.00 - 0.20 x10(3) uL 0.10    Immature Granulocyte Absolute      0.00 - 1.00 x10(3) uL 0.04    Neutrophils %      % 65.7    Lymphocytes %      % 25.2    Monocytes %      % 7.1    Eosinophils %      % 0.8    Basophils %      % 0.9    Immature Granulocyte %      % 0.3    CREATININE      0.55 - 1.02 mg/dL 0.72    EGFR      >=60 mL/min/1.73m2 89    SED RATE      0 - 30 mm/Hr 12    C-REACTIVE PROTEIN      <0.30 mg/dL 0.89 (H)    AST (SGOT)      15 - 37 U/L 16    ALT (SGPT)      13 - 56 U/L 37    Albumin      3.4 - 5.0 g/dL 3.8       Legend:  (H) High

## 2024-01-08 NOTE — TELEPHONE ENCOUNTER
Current Outpatient Medications   Medication Sig Dispense Refill    predniSONE 5 MG Oral Tab Take 1 tablet (5 mg total) by mouth daily. 90 tablet 1

## 2024-02-21 ENCOUNTER — LAB ENCOUNTER (OUTPATIENT)
Dept: LAB | Facility: HOSPITAL | Age: 74
End: 2024-02-21
Attending: INTERNAL MEDICINE
Payer: MEDICARE

## 2024-02-21 DIAGNOSIS — M35.3 PMR (POLYMYALGIA RHEUMATICA) (HCC): ICD-10-CM

## 2024-02-21 DIAGNOSIS — Z51.81 MEDICATION MONITORING ENCOUNTER: ICD-10-CM

## 2024-02-21 DIAGNOSIS — M06.09 RHEUMATOID ARTHRITIS OF MULTIPLE SITES WITH NEGATIVE RHEUMATOID FACTOR (HCC): ICD-10-CM

## 2024-02-21 LAB
ALBUMIN SERPL-MCNC: 4.8 G/DL (ref 3.2–4.8)
ALT SERPL-CCNC: 29 U/L
AST SERPL-CCNC: 23 U/L (ref ?–34)
BASOPHILS # BLD AUTO: 0.1 X10(3) UL (ref 0–0.2)
BASOPHILS NFR BLD AUTO: 0.8 %
CREAT BLD-MCNC: 0.69 MG/DL
CRP SERPL-MCNC: 2.1 MG/DL (ref ?–1)
DEPRECATED RDW RBC AUTO: 47.1 FL (ref 35.1–46.3)
EGFRCR SERPLBLD CKD-EPI 2021: 93 ML/MIN/1.73M2 (ref 60–?)
EOSINOPHIL # BLD AUTO: 0.07 X10(3) UL (ref 0–0.7)
EOSINOPHIL NFR BLD AUTO: 0.6 %
ERYTHROCYTE [DISTWIDTH] IN BLOOD BY AUTOMATED COUNT: 13.2 % (ref 11–15)
ERYTHROCYTE [SEDIMENTATION RATE] IN BLOOD: 17 MM/HR
HCT VFR BLD AUTO: 42.3 %
HGB BLD-MCNC: 13.6 G/DL
IMM GRANULOCYTES # BLD AUTO: 0.04 X10(3) UL (ref 0–1)
IMM GRANULOCYTES NFR BLD: 0.3 %
LYMPHOCYTES # BLD AUTO: 3.04 X10(3) UL (ref 1–4)
LYMPHOCYTES NFR BLD AUTO: 25 %
MCH RBC QN AUTO: 30.8 PG (ref 26–34)
MCHC RBC AUTO-ENTMCNC: 32.2 G/DL (ref 31–37)
MCV RBC AUTO: 95.9 FL
MONOCYTES # BLD AUTO: 0.79 X10(3) UL (ref 0.1–1)
MONOCYTES NFR BLD AUTO: 6.5 %
NEUTROPHILS # BLD AUTO: 8.11 X10 (3) UL (ref 1.5–7.7)
NEUTROPHILS # BLD AUTO: 8.11 X10(3) UL (ref 1.5–7.7)
NEUTROPHILS NFR BLD AUTO: 66.8 %
PLATELET # BLD AUTO: 394 10(3)UL (ref 150–450)
RBC # BLD AUTO: 4.41 X10(6)UL
WBC # BLD AUTO: 12.2 X10(3) UL (ref 4–11)

## 2024-02-21 PROCEDURE — 86140 C-REACTIVE PROTEIN: CPT

## 2024-02-21 PROCEDURE — 84460 ALANINE AMINO (ALT) (SGPT): CPT

## 2024-02-21 PROCEDURE — 85025 COMPLETE CBC W/AUTO DIFF WBC: CPT

## 2024-02-21 PROCEDURE — 84450 TRANSFERASE (AST) (SGOT): CPT

## 2024-02-21 PROCEDURE — 82565 ASSAY OF CREATININE: CPT

## 2024-02-21 PROCEDURE — 82040 ASSAY OF SERUM ALBUMIN: CPT

## 2024-02-21 PROCEDURE — 85652 RBC SED RATE AUTOMATED: CPT

## 2024-02-21 PROCEDURE — 36415 COLL VENOUS BLD VENIPUNCTURE: CPT

## 2024-03-04 ENCOUNTER — OFFICE VISIT (OUTPATIENT)
Dept: RHEUMATOLOGY | Facility: CLINIC | Age: 74
End: 2024-03-04
Payer: MEDICARE

## 2024-03-04 ENCOUNTER — HOSPITAL ENCOUNTER (OUTPATIENT)
Dept: GENERAL RADIOLOGY | Facility: HOSPITAL | Age: 74
Discharge: HOME | End: 2024-03-04
Attending: INTERNAL MEDICINE
Payer: MEDICARE

## 2024-03-04 VITALS
BODY MASS INDEX: 35.12 KG/M2 | HEIGHT: 61 IN | WEIGHT: 186 LBS | DIASTOLIC BLOOD PRESSURE: 75 MMHG | HEART RATE: 75 BPM | SYSTOLIC BLOOD PRESSURE: 131 MMHG

## 2024-03-04 DIAGNOSIS — M06.09 RHEUMATOID ARTHRITIS OF MULTIPLE SITES WITH NEGATIVE RHEUMATOID FACTOR (HCC): ICD-10-CM

## 2024-03-04 DIAGNOSIS — M35.3 PMR (POLYMYALGIA RHEUMATICA) (HCC): Primary | ICD-10-CM

## 2024-03-04 DIAGNOSIS — Z51.81 MEDICATION MONITORING ENCOUNTER: ICD-10-CM

## 2024-03-04 DIAGNOSIS — M25.551 RIGHT HIP PAIN: ICD-10-CM

## 2024-03-04 PROCEDURE — 99214 OFFICE O/P EST MOD 30 MIN: CPT | Performed by: INTERNAL MEDICINE

## 2024-03-04 PROCEDURE — 73502 X-RAY EXAM HIP UNI 2-3 VIEWS: CPT | Performed by: INTERNAL MEDICINE

## 2024-03-04 RX ORDER — METFORMIN HYDROCHLORIDE 500 MG/1
500 TABLET, EXTENDED RELEASE ORAL DAILY
COMMUNITY
Start: 2023-12-01

## 2024-03-04 RX ORDER — FOLIC ACID 1 MG/1
1 TABLET ORAL DAILY
Qty: 90 TABLET | Refills: 3 | Status: SHIPPED | OUTPATIENT
Start: 2024-03-04

## 2024-03-04 RX ORDER — METHYLPREDNISOLONE 4 MG/1
TABLET ORAL
Qty: 1 EACH | Refills: 0 | Status: SHIPPED | OUTPATIENT
Start: 2024-03-04

## 2024-03-04 RX ORDER — METHOTREXATE 2.5 MG/1
20 TABLET ORAL WEEKLY
Qty: 104 TABLET | Refills: 1 | Status: SHIPPED | OUTPATIENT
Start: 2024-03-04

## 2024-03-04 RX ORDER — PREDNISONE 5 MG/1
5 TABLET ORAL DAILY
Qty: 90 TABLET | Refills: 1 | Status: SHIPPED | OUTPATIENT
Start: 2024-03-04

## 2024-03-04 NOTE — PATIENT INSTRUCTIONS
You were seen today for PMR and RA  Worsening pain in right hip and shoulder  Try the medrol dose pack  Continue methotrexate and folic acid  Also continue prednisone 5 mg daily  Get xray today  See me in 3-4 mos, get blood work before you see me

## 2024-03-04 NOTE — PROGRESS NOTES
Miladys Quarles is a 73 year old female.    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     Current Medications:  Prednisone 5 mg daily- started mid Aug 2021  Methotrexate 8 pills weekly and FA daily- started 6/24/2022  Blood work:  CRP 3.0 mg/dL, ESR 22 (8/2021), now normal  Normal CBC and CMP  Neg TAVON, RF, CCP, HLA-B27, CK    Interval History:  This is a 72 yo F with hx of HLD, Hashimoto's/Hypothyroid presents with bilateral shoulder stiffness.  She received her first Covid vaccine back in March and then developed bilateral shoulder pain and stiffness.  She states that she was not able to lift her arms.  She is only able to move from her elbows down to her hands.  Symptoms lasted for about 17 days.  She was good for 2 days and then received her second Covid vaccine.  Again developed bilateral shoulder stiffness.  Symptoms slowly got better and she was doing well up until July.  She states it was hard for her to brush her hair, lift her arms or even take off her close due to the stiffness in her shoulders.  Blood work was done and she was found to have elevated CRP of 30.  She was started on prednisone 20 mg daily and her symptoms improved significantly.  She was dropped down by every week.  When she went down to 12.5 mg daily her symptoms came back.  She was then increased to 15 mg daily and has been on that since September 10.  Still has some stiffness in her shoulders but not as bad compared to when it started.  Denies any other joint pain or swelling.  Denies any rash, psoriasis, GI or  symptoms.  She reports fatigue.  Denies any fever chills, night sweats or weight loss.  Denies any temporal pain, headache or jaw pain or blurry  vision.    11/4/2021:  Presents for f/u of PMR  Now on prednisone 12.5 mg daily for the past week  Continues to have some shoulder stiffness mostly in her right shoulder.  She has full range of motion though  No stiffness in her thighs or hip region  She reports fatigue being a big component.  She feels tired all the time  She states that her sleep is not adequate.  She wakes up multiple times at night.  At times will watch TV.  She also has gained some weight, about 6 pounds in the past 1.5 months  Denies any other joint pain or swelling, denies any GCA symptoms    1/20/2022:  Presents for f/u of PMR  No down to prednisone 6 mg daily  Recent blood work shows CRP 1.15  Minimal pain in R shoulder but has FROM  No joint swelling  No GCA symptoms     4/25/2022:  Presents for f/u of PMR  No down to prednisone 1 mg daily  Recent blood work shows CRP 1.94  She was going to have prednisone by half milligram every week.  About 3 weeks ago developed tendinitis in her right ankle.  Her PCP gave her antibiotics thinking that there could have been infection.  She was seen by podiatrist and recommended to ice the tendon.  At times she will have some right knee pain.  Now having some pain in her left upper chest area near the collarbone.  Shoulder still feels stiff.  Hips feel stiff.  Overall reports a lot of stiffness and muscle pain  Reports a lot of fatigue  No GCA symptoms    5/19/2022:  Presents for f/u of PMR likely progressed RA/spondylarthritis  Blood work still shows slightly elevated CRP of 1.94.  Negative RF, CRP and HLA-B27  Now reports worsening pain in her left collarbone, left shoulder.  Also having a lot of pain and swelling in her right Achilles tendon.  Due to the symptoms her prednisone was increased, now on 10 mg daily  Now left collar bone pain is better  Continues to have some pain and swelling in R achilles, will be doing PT  No GCA symptoms     6/9/2022:  Presents for f/u of PMR likely progressed  RA/spondylarthritis  Blood work still shows slightly elevated CRP of 1.94.  Negative RF, CRP and HLA-B27  Has now been having more pain involving her left collarbone, shoulder and even her right Achilles tendon.  Continues to have swelling in R achilles tendon. Doing PT but not helping, it was worse over the weekend.  L collar bone and shoulder are doing well  On prednisone 9 mg daily  Having some irration in the eyes, feels inflamed, no blurry vision, no GCA symptoms  Having some acid issues in the stomach  Worsening fatigue     8/3/2022:  Presents for f/u of PMR likely progressed RA/spondylarthritis  Blood work in April shows slightly elevated CRP of 1.94.  Negative RF, CRP and HLA-B27  Now down to prednisone 8 mg daily  Also on methotrexate 6 pills weekly and FA daily, on it for about 6 weeks now  Last week felt good, less stiffness and fatigue  But this week had some stiffness in hips   Some pain and stiffness in knees, but not bad, no swelling  Fatigue has improved a little  R achilles tendon pain and swelling has improved  Also having some indigestion in upper abdomen, feels bloating. Normals stools, no nausea or vomiting. Taking pepcid and gasX as needed  Also having a lot of dry eyes, saw eye doctor and started systane twice a day. Its helping the dry eyes  Also has chronic lower back pain      11/7/0222:  Presents for f/u of PMR likely progressed RA/spondylarthritis  Blood work in April shows slightly elevated CRP of 1.94.  Negative RF, CRP and HLA-B27  Now down to prednisone 4 mg daily  Also on methotrexate 6 pills weekly and FA daily  Recent blood work shows normal CBC, CRP slightly elevated at 1.28  Felt really good for 3 weeks in October, was on prednisone 5 mg daily as that time  Joint pain has improved significantly, still has stiffness though but not pain in the joints, no swelling in the joints   Able to raise both shoulders. No achilles pain or swelling  Also having a lot of fatigue for the last  week    1/12/2023:  Presents for f/u of PMR likely progressed RA/spondylarthritis  Blood work in April shows slightly elevated CRP of 1.94.  Negative RF, CRP and HLA-B27  He went down to prednisone 2 mg daily on 12/25/2022 and had worsening pain in her hips and shoulders.  She would hardly lift her arms, wash her face even walk her dog due to the symptoms.  She went back up to 4 mg daily and her symptoms improved significantly  Also on methotrexate 6 pills weekly and folic acid every day  Blood work showed normal ESR but CRP was increased 2.32.  Back in August it was normal.  When on methotrexate and prednisone 5 mg and was doing really well. Even on 3 mg and 4 mg having some symptoms.   Has had a lot of stress in the last 2 mos also.  Having stiffness in the hips, hard to walk. Also having some pain in r shoulder. 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 Left hand feels like its swollen at times  Having more dry eyes, using otc eye drops now  Shoulder pain has now gone, some stiffness in hips now   Does not have an heel pain anymore   Blood work showing elevated CRP 1.12  Bone density was done March and normal    7/20/2023:  Presents for f/u of PMR likely progressed RA/spondylarthritis  Currently on methotrexate 8 pills weekly and prednisone 5 mg  daily  Recent blood work shows mildly elevated CRP 1.11  At times will have fatigue  Has had some hip pain when sleeping but resolved  No stiffness in the morning but has stiffness after prolonged siting or after car ride  Overall doing better    10/30/2023:  Presents for f/u of PMR likely progressed RA/spondylarthritis  Currently on methotrexate 8 pills weekly and prednisone 5 mg daily  Recent blood work shows normal inflammatory markers  Starting in Aug 2023 due to some left ear pain and headache and was given doxycycline and now having ringing in both ears since then.   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 When laying on the right side can have some pain in right hp  Hands are stable  Some mild right knee pain  Achilles pain has been stable  Shoulders are good    3/4/2024:  Presents for f/u of PMR likely progressed RA/spondylarthritis  Currently on methotrexate 8 pills weekly and prednisone 5 mg daily  Recent blood work shows mildly elevated CRP 2.10 mg/dL  In January had a sinus/respiratory infection and had it for about 1 month and since then having some pain in left hip  Has to  left hip to put in the car, hard to step with the right leg first. Hard to bend at the right  hip. Also hard to sleep due to pain in the right hip  Having some pain in the right shoulder, flexion and reaching in the cabinet is difficult. Has no issue with r shoulder abduction.   Having worsening pain in the neck  Minimal pain in the hands       HISTORY:  No past medical history on file.   Social Hx Reviewed   Family Hx Reviewed     Medications (Active prior to today's visit):  Current Outpatient  Medications   Medication Sig Dispense Refill    metFORMIN  MG Oral Tablet 24 Hr Take 1 tablet (500 mg total) by mouth daily.      predniSONE 5 MG Oral Tab Take 1 tablet (5 mg total) by mouth daily. 90 tablet 0    methotrexate 2.5 MG Oral Tab Take 8 tablets (20 mg total) by mouth once a week. 104 tablet 1    folic acid 1 MG Oral Tab Take 1 tablet (1 mg total) by mouth daily. 90 tablet 2    levothyroxine 112 MCG Oral Tab Take 1 tablet (112 mcg total) by mouth before breakfast.      Fexofenadine HCl (ALLEGRA ALLERGY OR) Take by mouth as needed.      ascorbic acid 1000 MG Oral Tab Take 1 tablet (1,000 mg total) by mouth daily.      magnesium 250 MG Oral Tab Take 1 tablet (250 mg total) by mouth.      Fluticasone Propionate 50 MCG/ACT Nasal Suspension Flonase 50 mcg/actuation nasal spray,suspension   Spray 2 sprays every day by nasal route for 30 days.      Multiple Vitamins-Minerals (CENTRUM SILVER ULTRA WOMENS) Oral Tab Take by mouth.      B Complex Vitamins (VITAMIN B COMPLEX OR) Inject as directed.      Cholecalciferol (VITAMIN D) 50 MCG (2000 UT) Oral Tab Take by mouth 2 (two) times a day.      Menaquinone-7 (VITAMIN K2 OR) Take by mouth.      Calcium Carbonate-Vit D-Min (CALTRATE 600+D PLUS OR) Take by mouth.      Krill Oil 500 MG Oral Cap Take by mouth.      Probiotic Product (Welcome Real-time OR) Take by mouth.      Lactobacillus (ACIDOPHOLUS OR) Take by mouth.      GLUCOSAMINE-CHONDROITIN OR Take by mouth.      Albuterol Sulfate  (90 Base) MCG/ACT Inhalation Aero Soln as needed for Shortness of Breath.      omega-3 fatty acids 1000 MG Oral Cap Take 3,000 mg by mouth daily. (Patient not taking: Reported on 3/4/2024)       .cmed  Allergies:  Allergies   Allergen Reactions    Penicillins RASH    Sulfa Antibiotics RASH         ROS:   All other ROS are negative.     PHYSICAL EXAM:   GEN: AAOx3, NAD  HEENT: EOMI, PERRLA, no injection or icterus, oral mucosa moist, no oral lesions. No lymphadenopathy.  No facial rash  CVS: RRR, no murmurs rubs or gallops. Equal 2+ distal pulses.   LUNGS: CTAB, no increased work of breathing  ABDOMEN:  soft NT/ND, +BS, no HSM  SKIN: No rashes or skin lesions. No nail findings  MSK:  Cervical spine: FROM  Hands: no synovitis in DIP, PIP and MCP, strong full fists  Wrist: FROM, no pain or swelling or warmth on palpation  Elbow: FROM, no pain or swelling or warmth on palpation  Shoulders: R shoulder has FROM  Hip: Right hip limited internal and external rotation  Knees: FROM, no warmth or effusion present. No pain with ROM.   Ankles: R achilles swelling improved  Feet: no pain with MTP squeeze, no toe swelling or pain or warmth on palpation with FROM  Spine: no lumbar or sacral pain on palpation.  NEURO: Cranial nerves II-XII intact grossly. 5/5 strength throughout in both upper and lower extremities, sensation intact.  PSYCH: normal mood       LABS:     Component      Latest Ref Rng & Units 9/15/2021   Quantiferon TB NIL      IU/mL 0.03   Quantiferon-TB1 Minus NIL      IU/mL 0.08   Quantiferon-TB2 Minus NIL      IU/mL 0.02   Quantiferon TB Mitogen minus NIL      IU/mL >10.00   QTB.RESULT      Negative Negative   HBSAg Screen      Nonreactive  Nonreactive   Hbsag Screen Index       <0.10   HEPATITIS B SURFACE AB QUAL      Nonreactive  Nonreactive   HEPATITIS B SURFACE AB QUANT      mIU/mL <3.10   C-Citrullinated Peptide IgG AB      0.0 - 6.9 U/mL 0.5   RHEUMATOID FACTOR      <15 IU/mL <10   HEPATITIS B CORE AB, TOTAL      Nonreactive  Nonreactive   GLUCOSE-6-PHOSPHATE DEHYDROGEN      9.9 - 16.6 U/g Hb 11.7   HCV AB      Nonreactive  Nonreactive     Imaging:     None    ASSESSMENT/PLAN:     B/L shoulder stiffness 2/2 PMR now likely progressed to RA/spondylarthritis  - Symptoms are classic for PMR.  Bilateral shoulder stiffness.  Elevated CRP and response to prednisone.    - No giant cell arteritis symptoms  - On methotrexate 8 pills weekly and folic acid daily and predisone 5 mg  daily  - Having worsening pain in right hip and right shoulder and elevated CRP, will give medrol dose pack for now   - Blood work reviewed with patient, will continue to monitor every 3 mos     Right hip pain  - She is having worsening pain in her right hip.  Hard to get in and out of the car or go up stairs due to the pain  - Plan to obtain x-ray of the right hip  - She also will do physical therapy    Tinnitus  - started in Aug 2023, was given Abx doxycyline and started after taking Abx  - advised patient that its not from the methotrexate  - she will see her PCP next month      Right Achilles tendinitis likely 2/2 Above- improved  - continue above treatment     Pt will f/u in 3 mos     Vida Cristina MD  3/4/2024  12:00 PM

## 2024-03-05 ENCOUNTER — PATIENT MESSAGE (OUTPATIENT)
Dept: RHEUMATOLOGY | Facility: CLINIC | Age: 74
End: 2024-03-05

## 2024-03-05 NOTE — TELEPHONE ENCOUNTER
From: Miladys Quarles  To: Vida Cristina  Sent: 3/5/2024 10:17 AM CST  Subject: Physical Therapy    You asked if I agree about going to physical therapy for my pain and I just wanted to let you know that I do plan on starting physical therapy to see if that will help with the pain.   I noticed on my physical therapy order for my secondary insurance you have my 's birth date as 1972 and it is actually 1945. I will correct it on the order.  Thank you

## 2024-05-13 ENCOUNTER — TELEPHONE (OUTPATIENT)
Dept: RHEUMATOLOGY | Facility: CLINIC | Age: 74
End: 2024-05-13

## 2024-05-13 NOTE — TELEPHONE ENCOUNTER
Call transferred in from Call Center. Pt states saw Dr Cristina 3/4/24 with shoulder, neck and hip pain; was prescribed a Prednisone taper and PT. Prednisone helped all her pain but not the right hip. Pt did physical therapy and got some relief in right hip but still had pain; therapist recommended an MRI be done. Pt went to [facility] regarding her right hip; had an xray the MRI of right hip. Pt read some of results over phone - osseous edema, contusion, tendinitis, bursitis. Pt will send report to Dr Cristina via [employer]. Ortho MD said right hip is not bone on bone so does not need a replacement. Laser therapy was recommended; pt had 6 treatments and got some relief but once completed treatments pain returned to same level in right hip. Pt had an injection of a numbing agent into her right hip on 5/10 at [facility]. States she got no relief and has put a call into the ortho MD to inform. Pt states she is using a cane most of the time to walk now. Pain described as feeling like a dagger in her hip. Pain also sometimes states pain right hip goes into groin. She takes ER Tylenol 2-3times per day; \"not much help.\" Pt states she is wondering if Methotrexte is causing some of these problems seen on MRI of right hip because the pill bottle states it can have an effect on bone marrow; educated pt about bone marrow production of RBC, WBC and platelets  is what may be effected.

## 2024-05-13 NOTE — TELEPHONE ENCOUNTER
Patient states that she is still having severe hip pain when she walks. Per the patient she is using a cane. Transferred call to rheumatology nurse.

## 2024-05-14 ENCOUNTER — TELEPHONE (OUTPATIENT)
Dept: RHEUMATOLOGY | Facility: CLINIC | Age: 74
End: 2024-05-14

## 2024-05-14 NOTE — TELEPHONE ENCOUNTER
MRI of the right hip done showing moderately severe chondral thinning in the right hip, reactive subchondral edema and or stress reaction, moderately severe gluteus minimus and anterior gluteus medius insertional tendinosis with trochanteric bursitis

## 2024-05-15 ENCOUNTER — PATIENT MESSAGE (OUTPATIENT)
Dept: RHEUMATOLOGY | Facility: CLINIC | Age: 74
End: 2024-05-15

## 2024-05-15 NOTE — TELEPHONE ENCOUNTER
LOV: 3/4/24  Future Appointments   Date Time Provider Department Center   6/24/2024 12:00 PM Vida Cristina MD [id number]     Labs:     Instructions    You were seen today for PMR and RA  Worsening pain in right hip and shoulder  Try the medrol dose pack  Continue methotrexate and folic acid  Also continue prednisone 5 mg daily  Get xray today  See me in 3-4 mos, get blood work before you see me

## 2024-05-15 NOTE — TELEPHONE ENCOUNTER
From: Miladys Quarles  To: Vida Cristina  Sent: 5/15/2024 10:30 AM CDT  Subject: My Orthopedic visit from 5/14    I saw Dr Obando on 5/14/24. I copied and pasted most of his notes. He does suggest a \"bone health consultation with a rheumatologist to see how my bone health could be further optimized\". Can you do that? There is a rheumatologist in his [facility] office but I couldn't see her until August. I would rather see you if you could check out my bone health . Please let me know.      Imaging:  No new imaging today. Prior MRI reviewed and shows superior weightbearing dome acetabular bone marrow. On the sagittal view, more of this bone marrow edema seems to be anterior compared to posterior.     Assessment:  Right acetabulum stress reaction/stress fracture     Discussion / Plan:  First, she could continue with nonsurgical treatment to continue strict nonweightbearing on the right lower extremity and a bone health consultation with a rheumatologist to see how her bone health could be further optimized. Second, she could elect to proceed with hip replacement surgery.  For now, she would like to continue nonsurgical management and I think this is reasonable. I do believe that there is a chance this gets better if her bone health can be optimized and she can stay off of the right lower extremity. She understands that if she is not feeling better in the next 4-6 weeks, she may be a candidate for hip replacement surgery.  We will tentatively see her back in 4 weeks for repeat evaluation..

## 2024-06-06 ENCOUNTER — LAB ENCOUNTER (OUTPATIENT)
Dept: LAB | Facility: HOSPITAL | Age: 74
End: 2024-06-06
Attending: FAMILY MEDICINE
Payer: MEDICARE

## 2024-06-06 DIAGNOSIS — E78.5 HYPERLIPEMIA: Primary | ICD-10-CM

## 2024-06-06 DIAGNOSIS — M06.09 RHEUMATOID ARTHRITIS OF MULTIPLE SITES WITH NEGATIVE RHEUMATOID FACTOR (HCC): ICD-10-CM

## 2024-06-06 DIAGNOSIS — M35.3 PMR (POLYMYALGIA RHEUMATICA) (HCC): ICD-10-CM

## 2024-06-06 DIAGNOSIS — Z51.81 MEDICATION MONITORING ENCOUNTER: ICD-10-CM

## 2024-06-06 DIAGNOSIS — R73.03 BORDERLINE DIABETES: ICD-10-CM

## 2024-06-06 LAB
ALBUMIN SERPL-MCNC: 4.7 G/DL (ref 3.2–4.8)
ALT SERPL-CCNC: 22 U/L
AST SERPL-CCNC: 16 U/L (ref ?–34)
BASOPHILS # BLD AUTO: 0.08 X10(3) UL (ref 0–0.2)
BASOPHILS NFR BLD AUTO: 0.7 %
CREAT BLD-MCNC: 0.71 MG/DL
CRP SERPL-MCNC: 1 MG/DL (ref ?–1)
DEPRECATED RDW RBC AUTO: 47.6 FL (ref 35.1–46.3)
EGFRCR SERPLBLD CKD-EPI 2021: 91 ML/MIN/1.73M2 (ref 60–?)
EOSINOPHIL # BLD AUTO: 0.17 X10(3) UL (ref 0–0.7)
EOSINOPHIL NFR BLD AUTO: 1.5 %
ERYTHROCYTE [DISTWIDTH] IN BLOOD BY AUTOMATED COUNT: 13.7 % (ref 11–15)
ERYTHROCYTE [SEDIMENTATION RATE] IN BLOOD: 8 MM/HR
EST. AVERAGE GLUCOSE BLD GHB EST-MCNC: 123 MG/DL (ref 68–126)
HBA1C MFR BLD: 5.9 % (ref ?–5.7)
HCT VFR BLD AUTO: 41.8 %
HGB BLD-MCNC: 13.8 G/DL
IMM GRANULOCYTES # BLD AUTO: 0.05 X10(3) UL (ref 0–1)
IMM GRANULOCYTES NFR BLD: 0.4 %
LYMPHOCYTES # BLD AUTO: 2.87 X10(3) UL (ref 1–4)
LYMPHOCYTES NFR BLD AUTO: 24.9 %
MCH RBC QN AUTO: 31.6 PG (ref 26–34)
MCHC RBC AUTO-ENTMCNC: 33 G/DL (ref 31–37)
MCV RBC AUTO: 95.7 FL
MONOCYTES # BLD AUTO: 1.25 X10(3) UL (ref 0.1–1)
MONOCYTES NFR BLD AUTO: 10.9 %
NEUTROPHILS # BLD AUTO: 7.1 X10 (3) UL (ref 1.5–7.7)
NEUTROPHILS # BLD AUTO: 7.1 X10(3) UL (ref 1.5–7.7)
NEUTROPHILS NFR BLD AUTO: 61.6 %
PLATELET # BLD AUTO: 367 10(3)UL (ref 150–450)
RBC # BLD AUTO: 4.37 X10(6)UL
TSI SER-ACNC: 0.45 MIU/ML (ref 0.55–4.78)
WBC # BLD AUTO: 11.5 X10(3) UL (ref 4–11)

## 2024-06-06 PROCEDURE — 84460 ALANINE AMINO (ALT) (SGPT): CPT

## 2024-06-06 PROCEDURE — 85652 RBC SED RATE AUTOMATED: CPT

## 2024-06-06 PROCEDURE — 36415 COLL VENOUS BLD VENIPUNCTURE: CPT

## 2024-06-06 PROCEDURE — 82040 ASSAY OF SERUM ALBUMIN: CPT

## 2024-06-06 PROCEDURE — 85025 COMPLETE CBC W/AUTO DIFF WBC: CPT

## 2024-06-06 PROCEDURE — 84450 TRANSFERASE (AST) (SGOT): CPT

## 2024-06-06 PROCEDURE — 86140 C-REACTIVE PROTEIN: CPT

## 2024-06-06 PROCEDURE — 83036 HEMOGLOBIN GLYCOSYLATED A1C: CPT

## 2024-06-06 PROCEDURE — 82565 ASSAY OF CREATININE: CPT

## 2024-06-06 PROCEDURE — 84443 ASSAY THYROID STIM HORMONE: CPT

## 2024-06-12 ENCOUNTER — OFFICE VISIT (OUTPATIENT)
Dept: RHEUMATOLOGY | Facility: CLINIC | Age: 74
End: 2024-06-12
Payer: MEDICARE

## 2024-06-12 VITALS
WEIGHT: 186 LBS | DIASTOLIC BLOOD PRESSURE: 70 MMHG | SYSTOLIC BLOOD PRESSURE: 131 MMHG | BODY MASS INDEX: 35.12 KG/M2 | HEART RATE: 58 BPM | HEIGHT: 61 IN

## 2024-06-12 DIAGNOSIS — M25.551 RIGHT HIP PAIN: ICD-10-CM

## 2024-06-12 DIAGNOSIS — M35.3 PMR (POLYMYALGIA RHEUMATICA) (HCC): Primary | ICD-10-CM

## 2024-06-12 DIAGNOSIS — Z51.81 MEDICATION MONITORING ENCOUNTER: ICD-10-CM

## 2024-06-12 DIAGNOSIS — M06.09 RHEUMATOID ARTHRITIS OF MULTIPLE SITES WITH NEGATIVE RHEUMATOID FACTOR (HCC): ICD-10-CM

## 2024-06-12 PROCEDURE — G2211 COMPLEX E/M VISIT ADD ON: HCPCS | Performed by: INTERNAL MEDICINE

## 2024-06-12 PROCEDURE — 99215 OFFICE O/P EST HI 40 MIN: CPT | Performed by: INTERNAL MEDICINE

## 2024-06-12 NOTE — PROGRESS NOTES
Miladys Quarles is a 73 year old female.    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     Current Medications:  Prednisone 5 mg daily- started mid Aug 2021  Methotrexate 8 pills weekly and FA daily- started 6/24/2022  Blood work:  CRP 3.0 mg/dL, ESR 22 (8/2021), now normal  Normal CBC and CMP  Neg TAVON, RF, CCP, HLA-B27, CK    Interval History:  This is a 70 yo F with hx of HLD, Hashimoto's/Hypothyroid presents with bilateral shoulder stiffness.  She received her first Covid vaccine back in March and then developed bilateral shoulder pain and stiffness.  She states that she was not able to lift her arms.  She is only able to move from her elbows down to her hands.  Symptoms lasted for about 17 days.  She was good for 2 days and then received her second Covid vaccine.  Again developed bilateral shoulder stiffness.  Symptoms slowly got better and she was doing well up until July.  She states it was hard for her to brush her hair, lift her arms or even take off her close due to the stiffness in her shoulders.  Blood work was done and she was found to have elevated CRP of 30.  She was started on prednisone 20 mg daily and her symptoms improved significantly.  She was dropped down by every week.  When she went down to 12.5 mg daily her symptoms came back.  She was then increased to 15 mg daily and has been on that since September 10.  Still has some stiffness in her shoulders but not as bad compared to when it started.  Denies any other joint pain or swelling.  Denies any rash, psoriasis, GI or  symptoms.  She reports fatigue.  Denies any fever chills, night sweats or weight loss.  Denies any temporal pain, headache or jaw pain or blurry vision.    11/4/2021:  Presents for f/u of PMR  Now on prednisone 12.5 mg daily for the past week  Continues to have some shoulder  stiffness mostly in her right shoulder.  She has full range of motion though  No stiffness in her thighs or hip region  She reports fatigue being a big component.  She feels tired all the time  She states that her sleep is not adequate.  She wakes up multiple times at night.  At times will watch TV.  She also has gained some weight, about 6 pounds in the past 1.5 months  Denies any other joint pain or swelling, denies any GCA symptoms    1/20/2022:  Presents for f/u of PMR  No down to prednisone 6 mg daily  Recent blood work shows CRP 1.15  Minimal pain in R shoulder but has FROM  No joint swelling  No GCA symptoms     4/25/2022:  Presents for f/u of PMR  No down to prednisone 1 mg daily  Recent blood work shows CRP 1.94  She was going to have prednisone by half milligram every week.  About 3 weeks ago developed tendinitis in her right ankle.  Her PCP gave her antibiotics thinking that there could have been infection.  She was seen by podiatrist and recommended to ice the tendon.  At times she will have some right knee pain.  Now having some pain in her left upper chest area near the collarbone.  Shoulder still feels stiff.  Hips feel stiff.  Overall reports a lot of stiffness and muscle pain  Reports a lot of fatigue  No GCA symptoms    5/19/2022:  Presents for f/u of PMR likely progressed RA/spondylarthritis  Blood work still shows slightly elevated CRP of 1.94.  Negative RF, CRP and HLA-B27  Now reports worsening pain in her left collarbone, left shoulder.  Also having a lot of pain and swelling in her right Achilles tendon.  Due to the symptoms her prednisone was increased, now on 10 mg daily  Now left collar bone pain is better  Continues to have some pain and swelling in R achilles, will be doing PT  No GCA symptoms     6/9/2022:  Presents for f/u of PMR likely progressed RA/spondylarthritis  Blood work still shows slightly elevated CRP of 1.94.  Negative RF, CRP and HLA-B27  Has now been having more pain  involving her left collarbone, shoulder and even her right Achilles tendon.  Continues to have swelling in R achilles tendon. Doing PT but not helping, it was worse over the weekend.  L collar bone and shoulder are doing well  On prednisone 9 mg daily  Having some irration in the eyes, feels inflamed, no blurry vision, no GCA symptoms  Having some acid issues in the stomach  Worsening fatigue     8/3/2022:  Presents for f/u of PMR likely progressed RA/spondylarthritis  Blood work in April shows slightly elevated CRP of 1.94.  Negative RF, CRP and HLA-B27  Now down to prednisone 8 mg daily  Also on methotrexate 6 pills weekly and FA daily, on it for about 6 weeks now  Last week felt good, less stiffness and fatigue  But this week had some stiffness in hips   Some pain and stiffness in knees, but not bad, no swelling  Fatigue has improved a little  R achilles tendon pain and swelling has improved  Also having some indigestion in upper abdomen, feels bloating. Normals stools, no nausea or vomiting. Taking pepcid and gasX as needed  Also having a lot of dry eyes, saw eye doctor and started systane twice a day. Its helping the dry eyes  Also has chronic lower back pain      11/7/0222:  Presents for f/u of PMR likely progressed RA/spondylarthritis  Blood work in April shows slightly elevated CRP of 1.94.  Negative RF, CRP and HLA-B27  Now down to prednisone 4 mg daily  Also on methotrexate 6 pills weekly and FA daily  Recent blood work shows normal CBC, CRP slightly elevated at 1.28  Felt really good for 3 weeks in October, was on prednisone 5 mg daily as that time  Joint pain has improved significantly, still has stiffness though but not pain in the joints, no swelling in the joints   Able to raise both shoulders. No achilles pain or swelling  Also having a lot of fatigue for the last week    1/12/2023:  Presents for f/u of PMR likely progressed RA/spondylarthritis  Blood work in April shows slightly elevated CRP of  1.94.  Negative RF, CRP and HLA-B27  He went down to prednisone 2 mg daily on 12/25/2022 and had worsening pain in her hips and shoulders.  She would hardly lift her arms, wash her face even walk her dog due to the symptoms.  She went back up to 4 mg daily and her symptoms improved significantly  Also on methotrexate 6 pills weekly and folic acid every day  Blood work showed normal ESR but CRP was increased 2.32.  Back in August it was normal.  When on methotrexate and prednisone 5 mg and was doing really well. Even on 3 mg and 4 mg having some symptoms.   Has had a lot of stress in the last 2 mos also.  Having stiffness in the hips, hard to walk. Also having some pain in r shoulder. 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 Left hand feels like its swollen at times  Having more dry eyes, using otc eye drops now  Shoulder pain has now gone, some stiffness in hips now   Does not have an heel pain anymore   Blood work showing elevated CRP 1.12  Bone density was done March and normal    7/20/2023:  Presents for f/u of PMR likely progressed RA/spondylarthritis  Currently on methotrexate 8 pills weekly and prednisone 5 mg daily  Recent blood work shows mildly elevated CRP 1.11  At times will have fatigue  Has had some hip pain when sleeping but resolved  No  stiffness in the morning but has stiffness after prolonged siting or after car ride  Overall doing better    10/30/2023:  Presents for f/u of PMR likely progressed RA/spondylarthritis  Currently on methotrexate 8 pills weekly and prednisone 5 mg daily  Recent blood work shows normal inflammatory markers  Starting in Aug 2023 due to some left ear pain and headache and was given doxycycline and now having ringing in both ears since then.   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 When laying on the right side can have some pain in right hp  Hands are stable  Some mild right knee pain  Achilles pain has been stable  Shoulders are good    3/4/2024:  Presents for f/u of PMR likely progressed RA/spondylarthritis  Currently on methotrexate 8 pills weekly and prednisone 5 mg daily  Recent blood work shows mildly elevated CRP 2.10 mg/dL  In January had a sinus/respiratory infection and had it for about 1 month and since then having some pain in left hip  Has to  left hip to put in the car, hard to step with the right leg first. Hard to bend at the right  hip. Also hard to sleep due to pain in the right hip  Having some pain in the right shoulder, flexion and reaching in the cabinet is difficult. Has no issue with r shoulder abduction.   Having worsening pain in the neck  Minimal pain in the hands     6/12/2024:  Presents for f/u of PMR likely progressed RA/spondylarthritis  Currently on methotrexate 8 pills weekly and prednisone 5 mg daily  She was having worsening right hip pain.  MRI of the hip showed moderate to severe osseous edema including contusion, reactive subchondral edema/stress reaction  in the hip joint.  Also showed moderately severe gluteus minimus and anterior gluteus medius tendinosis of the greater trochanteric bursa  She was following with physiatry right acetabulum stress reaction/stress fracture  She had a diagnostic injection with minimal improvement.  They were concerned about her bone health.  They are recommending conservative treatment but if no improvement then a hip replacement  Had bone density done March 2023 which was essentially normal, left femoral neck T-score -0.4, lumbar spine -0.1  Continues to have pain in right hip, has to use walker now   Recent blood work showing mildly elevated CRP 1.00  She also tried PT but right hip pain worsened   Having some mild hand pain and some right knee pain but not severe          HISTORY:  No past medical history on file.   Social Hx Reviewed   Family Hx Reviewed     Medications (Active prior to today's visit):  Current Outpatient Medications   Medication Sig Dispense Refill    metFORMIN  MG Oral Tablet 24 Hr Take 1 tablet (500 mg total) by mouth daily.      predniSONE 5 MG Oral Tab Take 1 tablet (5 mg total) by mouth daily. 90 tablet 1    methotrexate 2.5 MG Oral Tab Take 8 tablets (20 mg total) by mouth once a week. 104 tablet 1    folic acid 1 MG Oral Tab Take 1 tablet (1 mg total) by mouth daily. 90 tablet 2    levothyroxine 112 MCG Oral Tab Take 1 tablet (112 mcg total) by mouth before breakfast.      Fexofenadine HCl (ALLEGRA ALLERGY OR) Take by mouth as needed.      ascorbic acid 1000 MG Oral Tab Take 1 tablet (1,000 mg total) by mouth daily.      omega-3 fatty acids 1000 MG Oral Cap Take 3,000 mg by mouth daily.      magnesium 250 MG Oral Tab Take 1 tablet (250 mg total) by mouth.      Fluticasone Propionate 50 MCG/ACT Nasal Suspension Flonase 50 mcg/actuation nasal spray,suspension   Spray 2 sprays every day by nasal route for 30 days.      Multiple Vitamins-Minerals (CENTRUM SILVER ULTRA WOMENS) Oral Tab Take by mouth.       B Complex Vitamins (VITAMIN B COMPLEX OR) Inject as directed.      Cholecalciferol (VITAMIN D) 50 MCG (2000 UT) Oral Tab Take by mouth 2 (two) times a day.      Menaquinone-7 (VITAMIN K2 OR) Take by mouth.      Calcium Carbonate-Vit D-Min (CALTRATE 600+D PLUS OR) Take by mouth.      Krill Oil 500 MG Oral Cap Take by mouth.      Probiotic Product ([employer] OR) Take by mouth.      Lactobacillus (ACIDOPHOLUS OR) Take by mouth.      GLUCOSAMINE-CHONDROITIN OR Take by mouth.      Albuterol Sulfate  (90 Base) MCG/ACT Inhalation Aero Soln as needed for Shortness of Breath.      methylPREDNISolone 4 MG Oral Tablet Therapy Pack Take as directed on package. 1 each 0    methotrexate 2.5 MG Oral Tab Take 8 tablets (20 mg total) by mouth once a week. (Patient not taking: Reported on 6/12/2024) 104 tablet 1    folic acid 1 MG Oral Tab Take 1 tablet (1 mg total) by mouth daily. (Patient not taking: Reported on 6/12/2024) 90 tablet 3     .cmed  Allergies:  Allergies   Allergen Reactions    Penicillins RASH    Sulfa Antibiotics RASH       ROS:   All other ROS are negative.     PHYSICAL EXAM:   GEN: AAOx3, NAD  HEENT: EOMI, PERRLA, no injection or icterus, oral mucosa moist, no oral lesions. No lymphadenopathy. No facial rash  CVS: RRR, no murmurs rubs or gallops. Equal 2+ distal pulses.   LUNGS: CTAB, no increased work of breathing  ABDOMEN:  soft NT/ND, +BS, no HSM  SKIN: No rashes or skin lesions. No nail findings  MSK:  Cervical spine: FROM  Hands: no synovitis in DIP, PIP and MCP, strong full fists  Wrist: FROM, no pain or swelling or warmth on palpation  Elbow: FROM, no pain or swelling or warmth on palpation  Shoulders: R shoulder has FROM  Hip: Right hip limited internal and external rotation  Knees: FROM, no warmth or effusion present. No pain with ROM.   Ankles: R achilles swelling improved  Feet: no pain with MTP squeeze, no toe swelling or pain or warmth on palpation with FROM  Spine: no lumbar or  sacral pain on palpation.  NEURO: Cranial nerves II-XII intact grossly. 5/5 strength throughout in both upper and lower extremities, sensation intact.  PSYCH: normal mood       LABS:     Component      Latest Ref Rng & Units 9/15/2021   Quantiferon TB NIL      IU/mL 0.03   Quantiferon-TB1 Minus NIL      IU/mL 0.08   Quantiferon-TB2 Minus NIL      IU/mL 0.02   Quantiferon TB Mitogen minus NIL      IU/mL >10.00   QTB.RESULT      Negative Negative   HBSAg Screen      Nonreactive  Nonreactive   Hbsag Screen Index       <0.10   HEPATITIS B SURFACE AB QUAL      Nonreactive  Nonreactive   HEPATITIS B SURFACE AB QUANT      mIU/mL <3.10   C-Citrullinated Peptide IgG AB      0.0 - 6.9 U/mL 0.5   RHEUMATOID FACTOR      <15 IU/mL <10   HEPATITIS B CORE AB, TOTAL      Nonreactive  Nonreactive   GLUCOSE-6-PHOSPHATE DEHYDROGEN      9.9 - 16.6 U/g Hb 11.7   HCV AB      Nonreactive  Nonreactive     Imaging:     None    ASSESSMENT/PLAN:     B/L shoulder stiffness 2/2 PMR now likely progressed to RA/spondylarthritis- stable  - Symptoms are classic for PMR.  Bilateral shoulder stiffness.  Elevated CRP and response to prednisone.    - No giant cell arteritis symptoms  - On methotrexate 8 pills weekly and folic acid daily and predisone 5 mg daily  - Blood work reviewed with patient, will continue to monitor every 3 mos     Right hip pain 2/2 stress fracture and severe gluteas minimus tendonesis and bursitis   - MRI reviewed  - seeing physiatry, recommending conservative treatment and maybe do hip replacement   - recent bone density 3/2023 was normal    Right Achilles tendinitis likely 2/2 Above- improved  - continue above treatment     Spent 40 minutes obtaining history, evaluating patient, reviewing labs, discussing treatment options and completing documentation    Pt will f/u in 3 mos     There is a longitudinal care relationship with me, the care plan reflects the ongoing nature of the continuous relationship of care, and the medical  record indicates that there is ongoing treatment of a serious/complex medical condition which I am currently managing.  is Applicable.     Vida Cristina MD  6/12/2024  1:40 PM

## 2024-06-12 NOTE — PATIENT INSTRUCTIONS
You were seen today for rheumatoid arthritis  Joints overall look stable except your right hip  Right hip is showing a lot of tendon issues, stress fractures, continue to see orthopedic  I can give you a steroid pack if you need to, please let me know  Get blood work in 3 to 4 months  See me in 3 to 4 months

## 2024-07-26 ENCOUNTER — PATIENT MESSAGE (OUTPATIENT)
Dept: RHEUMATOLOGY | Facility: CLINIC | Age: 74
End: 2024-07-26

## 2024-07-26 ENCOUNTER — TELEPHONE (OUTPATIENT)
Dept: RHEUMATOLOGY | Facility: CLINIC | Age: 74
End: 2024-07-26

## 2024-07-26 NOTE — TELEPHONE ENCOUNTER
Name and  verified. Pt advised to hold methotrexate dose due today. Pt is not sure how long she will be on antibiotics. She will let the office know. She was advised to hold methotrexate until antibiotics completed. Start methotrexate a couple of days after antibiotics competed to make sure the infection is gone. Pt will update office on how long she needs to take them and if she is flaring.         2024  Miladys altamirano [facility] Clinical Staff (supporting Vida Cristina MD)         24  3:06 PM  My primary care Dr. Hanson prescribed Ciprofloxacin 500 mg and metroNIDAZOLE 500 mg for possible diverticulitis.  I haven't taken my methotrexate since last Friday and am supposed to take it today.  I was just wondering if those antibiotics are all right to take and if I should stop the methotrexate for time.  Please let me know.  I am going to try to call your offic

## 2024-07-26 NOTE — TELEPHONE ENCOUNTER
Patient called to speak to a nurse regarding medication methotrexate and metronidazole. Patient would like a call back today if possible. Has a few questions regarding what to do.

## 2024-07-26 NOTE — TELEPHONE ENCOUNTER
From: Miladys Quarles  To: Vida Cristina  Sent: 7/26/2024 3:06 PM CDT  Subject: Antibiotic Question for possible Diverticulitis    My primary care Dr. Hanson prescribed Ciprofloxacin 500 mg and metroNIDAZOLE 500 mg for possible diverticulitis. I haven't taken my methotrexate since last Friday and am supposed to take it today. I was just wondering if those antibiotics are all right to take and if I should stop the methotrexate for time. Please let me know. I am going to try to call your office.

## 2024-08-13 RX ORDER — METHOTREXATE 2.5 MG/1
20 TABLET ORAL WEEKLY
Qty: 104 TABLET | Refills: 0 | Status: SHIPPED | OUTPATIENT
Start: 2024-08-13

## 2024-08-13 NOTE — TELEPHONE ENCOUNTER
Requested Prescriptions     Pending Prescriptions Disp Refills    METHOTREXATE 2.5 MG Oral Tab [Pharmacy Med Name: Methotrexate 2.5 MG Oral Tablet] 104 tablet 0     Sig: TAKE 8 TABLETS BY MOUTH ONCE A WEEK     Future Appointments   Date Time Provider Department Center   9/24/2024 12:00 PM Vida Cristina MD [facility]      LOV: 6/12/24   Last Refilled:3/4/24 #104 1RF   Labs:  Component      Latest Ref Rng 6/6/2024   WBC      4.0 - 11.0 x10(3) uL 11.5 (H)    RBC      3.80 - 5.30 x10(6)uL 4.37    Hemoglobin      12.0 - 16.0 g/dL 13.8    Hematocrit      35.0 - 48.0 % 41.8    MCV      80.0 - 100.0 fL 95.7    MCH      26.0 - 34.0 pg 31.6    MCHC      31.0 - 37.0 g/dL 33.0    RDW-SD      35.1 - 46.3 fL 47.6 (H)    RDW      11.0 - 15.0 % 13.7    Platelet Count      150.0 - 450.0 10(3)uL 367.0    Prelim Neutrophil Abs      1.50 - 7.70 x10 (3) uL 7.10    Neutrophils Absolute      1.50 - 7.70 x10(3) uL 7.10    Lymphocytes Absolute      1.00 - 4.00 x10(3) uL 2.87    Monocytes Absolute      0.10 - 1.00 x10(3) uL 1.25 (H)    Eosinophils Absolute      0.00 - 0.70 x10(3) uL 0.17    Basophils Absolute      0.00 - 0.20 x10(3) uL 0.08    Immature Granulocyte Absolute      0.00 - 1.00 x10(3) uL 0.05    Neutrophils %      % 61.6    Lymphocytes %      % 24.9    Monocytes %      % 10.9    Eosinophils %      % 1.5    Basophils %      % 0.7    Immature Granulocyte %      % 0.4    CREATININE      0.55 - 1.02 mg/dL 0.71    EGFR      >=60 mL/min/1.73m2 91    SED RATE      0 - 30 mm/Hr 8    C-REACTIVE PROTEIN      <1.00 mg/dL 1.00 (H)    AST (SGOT)      <=34 U/L 16    ALT (SGPT)      10 - 49 U/L 22    Albumin      3.2 - 4.8 g/dL 4.7       Legend:  (H) High    ASSESSMENT/PLAN:      B/L shoulder stiffness 2/2 PMR now likely progressed to RA/spondylarthritis- stable  - Symptoms are classic for PMR.  Bilateral shoulder stiffness.  Elevated CRP and response to prednisone.    - No giant cell arteritis symptoms  - On methotrexate 8 pills weekly  and folic acid daily and predisone 5 mg daily  - Blood work reviewed with patient, will continue to monitor every 3 mos      Right hip pain 2/2 stress fracture and severe gluteas minimus tendonesis and bursitis   - MRI reviewed  - seeing physiatry, recommending conservative treatment and maybe do hip replacement   - recent bone density 3/2023 was normal     Right Achilles tendinitis likely 2/2 Above- improved  - continue above treatment      Spent 40 minutes obtaining history, evaluating patient, reviewing labs, discussing treatment options and completing documentation     Pt will f/u in 3 mos      There is a longitudinal care relationship with me, the care plan reflects the ongoing nature of the continuous relationship of care, and the medical record indicates that there is ongoing treatment of a serious/complex medical condition which I am currently managing.  is Applicable.      Vida Cristina MD  6/12/2024  1:40 PM

## 2024-08-14 RX ORDER — METHOTREXATE 2.5 MG/1
20 TABLET ORAL WEEKLY
Qty: 104 TABLET | Refills: 0 | OUTPATIENT
Start: 2024-08-14

## 2024-09-10 ENCOUNTER — LAB ENCOUNTER (OUTPATIENT)
Dept: LAB | Facility: HOSPITAL | Age: 74
End: 2024-09-10
Attending: INTERNAL MEDICINE
Payer: MEDICARE

## 2024-09-10 DIAGNOSIS — M35.3 PMR (POLYMYALGIA RHEUMATICA) (HCC): ICD-10-CM

## 2024-09-10 DIAGNOSIS — M06.09 RHEUMATOID ARTHRITIS OF MULTIPLE SITES WITH NEGATIVE RHEUMATOID FACTOR (HCC): ICD-10-CM

## 2024-09-10 DIAGNOSIS — Z51.81 MEDICATION MONITORING ENCOUNTER: ICD-10-CM

## 2024-09-10 DIAGNOSIS — M25.551 RIGHT HIP PAIN: ICD-10-CM

## 2024-09-10 LAB
ALBUMIN SERPL-MCNC: 4.5 G/DL (ref 3.2–4.8)
ALT SERPL-CCNC: 24 U/L
AST SERPL-CCNC: 18 U/L (ref ?–34)
BASOPHILS # BLD AUTO: 0.11 X10(3) UL (ref 0–0.2)
BASOPHILS NFR BLD AUTO: 0.9 %
CREAT BLD-MCNC: 0.66 MG/DL
CRP SERPL-MCNC: 0.5 MG/DL (ref ?–1)
DEPRECATED RDW RBC AUTO: 48.5 FL (ref 35.1–46.3)
EGFRCR SERPLBLD CKD-EPI 2021: 93 ML/MIN/1.73M2 (ref 60–?)
EOSINOPHIL # BLD AUTO: 0.1 X10(3) UL (ref 0–0.7)
EOSINOPHIL NFR BLD AUTO: 0.8 %
ERYTHROCYTE [DISTWIDTH] IN BLOOD BY AUTOMATED COUNT: 13.7 % (ref 11–15)
ERYTHROCYTE [SEDIMENTATION RATE] IN BLOOD: 7 MM/HR
HCT VFR BLD AUTO: 40.8 %
HGB BLD-MCNC: 13.5 G/DL
IMM GRANULOCYTES # BLD AUTO: 0.03 X10(3) UL (ref 0–1)
IMM GRANULOCYTES NFR BLD: 0.3 %
LYMPHOCYTES # BLD AUTO: 3.33 X10(3) UL (ref 1–4)
LYMPHOCYTES NFR BLD AUTO: 28.2 %
MCH RBC QN AUTO: 32.2 PG (ref 26–34)
MCHC RBC AUTO-ENTMCNC: 33.1 G/DL (ref 31–37)
MCV RBC AUTO: 97.4 FL
MONOCYTES # BLD AUTO: 0.98 X10(3) UL (ref 0.1–1)
MONOCYTES NFR BLD AUTO: 8.3 %
NEUTROPHILS # BLD AUTO: 7.26 X10 (3) UL (ref 1.5–7.7)
NEUTROPHILS # BLD AUTO: 7.26 X10(3) UL (ref 1.5–7.7)
NEUTROPHILS NFR BLD AUTO: 61.5 %
PLATELET # BLD AUTO: 377 10(3)UL (ref 150–450)
RBC # BLD AUTO: 4.19 X10(6)UL
WBC # BLD AUTO: 11.8 X10(3) UL (ref 4–11)

## 2024-09-10 PROCEDURE — 84460 ALANINE AMINO (ALT) (SGPT): CPT

## 2024-09-10 PROCEDURE — 36415 COLL VENOUS BLD VENIPUNCTURE: CPT

## 2024-09-10 PROCEDURE — 86140 C-REACTIVE PROTEIN: CPT

## 2024-09-10 PROCEDURE — 84450 TRANSFERASE (AST) (SGOT): CPT

## 2024-09-10 PROCEDURE — 85652 RBC SED RATE AUTOMATED: CPT

## 2024-09-10 PROCEDURE — 82565 ASSAY OF CREATININE: CPT

## 2024-09-10 PROCEDURE — 82040 ASSAY OF SERUM ALBUMIN: CPT

## 2024-09-10 PROCEDURE — 85025 COMPLETE CBC W/AUTO DIFF WBC: CPT

## 2024-09-24 ENCOUNTER — OFFICE VISIT (OUTPATIENT)
Dept: RHEUMATOLOGY | Facility: CLINIC | Age: 74
End: 2024-09-24
Payer: MEDICARE

## 2024-09-24 VITALS
BODY MASS INDEX: 35.12 KG/M2 | WEIGHT: 186 LBS | HEIGHT: 61 IN | DIASTOLIC BLOOD PRESSURE: 75 MMHG | SYSTOLIC BLOOD PRESSURE: 135 MMHG | HEART RATE: 67 BPM

## 2024-09-24 DIAGNOSIS — M35.3 PMR (POLYMYALGIA RHEUMATICA) (HCC): Primary | ICD-10-CM

## 2024-09-24 DIAGNOSIS — Z51.81 MEDICATION MONITORING ENCOUNTER: ICD-10-CM

## 2024-09-24 DIAGNOSIS — M06.09 RHEUMATOID ARTHRITIS OF MULTIPLE SITES WITH NEGATIVE RHEUMATOID FACTOR (HCC): ICD-10-CM

## 2024-09-24 DIAGNOSIS — M25.551 RIGHT HIP PAIN: ICD-10-CM

## 2024-09-24 PROCEDURE — 99214 OFFICE O/P EST MOD 30 MIN: CPT | Performed by: INTERNAL MEDICINE

## 2024-09-24 PROCEDURE — G2211 COMPLEX E/M VISIT ADD ON: HCPCS | Performed by: INTERNAL MEDICINE

## 2024-09-24 RX ORDER — PREDNISONE 5 MG/1
5 TABLET ORAL DAILY
Qty: 90 TABLET | Refills: 1 | Status: SHIPPED | OUTPATIENT
Start: 2024-09-24

## 2024-09-24 NOTE — PATIENT INSTRUCTIONS
You were seen for RA  Continue methotrexate, folic acid, prednisone  See Dr. Leal for the hip pain  Blood work looked good   Blood work in 3-4 mos  See me in 3-4 mos

## 2024-09-24 NOTE — PROGRESS NOTES
Miladys Quarles is a 74 year old female.    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     Current Medications:  Prednisone 5 mg daily- started mid Aug 2021  Methotrexate 8 pills weekly and FA daily- started 6/24/2022  Blood work:  CRP 3.0 mg/dL, ESR 22 (8/2021), now normal  Normal CBC and CMP  Neg TAVON, RF, CCP, HLA-B27, CK    Interval History:  This is a 70 yo F with hx of HLD, Hashimoto's/Hypothyroid presents with bilateral shoulder stiffness.  She received her first Covid vaccine back in March and then developed bilateral shoulder pain and stiffness.  She states that she was not able to lift her arms.  She is only able to move from her elbows down to her hands.  Symptoms lasted for about 17 days.  She was good for 2 days and then received her second Covid vaccine.  Again developed bilateral shoulder stiffness.  Symptoms slowly got better and she was doing well up until July.  She states it was hard for her to brush her hair, lift her arms or even take off her close due to the stiffness in her shoulders.  Blood work was done and she was found to have elevated CRP of 30.  She was started on prednisone 20 mg daily and her symptoms improved significantly.  She was dropped down by every week.  When she went down to 12.5 mg daily her symptoms came back.  She was then increased to 15 mg daily and has been on that since September 10.  Still has some stiffness in her shoulders but not as bad compared to when it started.  Denies any other joint pain or swelling.  Denies any rash, psoriasis, GI or  symptoms.  She reports fatigue.  Denies any fever chills, night sweats or weight loss.  Denies any temporal pain, headache or jaw pain or blurry vision.    11/4/2021:  Presents for f/u of PMR  Now on prednisone 12.5 mg daily for the past week  Continues to have some shoulder  stiffness mostly in her right shoulder.  She has full range of motion though  No stiffness in her thighs or hip region  She reports fatigue being a big component.  She feels tired all the time  She states that her sleep is not adequate.  She wakes up multiple times at night.  At times will watch TV.  She also has gained some weight, about 6 pounds in the past 1.5 months  Denies any other joint pain or swelling, denies any GCA symptoms    1/20/2022:  Presents for f/u of PMR  No down to prednisone 6 mg daily  Recent blood work shows CRP 1.15  Minimal pain in R shoulder but has FROM  No joint swelling  No GCA symptoms     4/25/2022:  Presents for f/u of PMR  No down to prednisone 1 mg daily  Recent blood work shows CRP 1.94  She was going to have prednisone by half milligram every week.  About 3 weeks ago developed tendinitis in her right ankle.  Her PCP gave her antibiotics thinking that there could have been infection.  She was seen by podiatrist and recommended to ice the tendon.  At times she will have some right knee pain.  Now having some pain in her left upper chest area near the collarbone.  Shoulder still feels stiff.  Hips feel stiff.  Overall reports a lot of stiffness and muscle pain  Reports a lot of fatigue  No GCA symptoms    5/19/2022:  Presents for f/u of PMR likely progressed RA/spondylarthritis  Blood work still shows slightly elevated CRP of 1.94.  Negative RF, CRP and HLA-B27  Now reports worsening pain in her left collarbone, left shoulder.  Also having a lot of pain and swelling in her right Achilles tendon.  Due to the symptoms her prednisone was increased, now on 10 mg daily  Now left collar bone pain is better  Continues to have some pain and swelling in R achilles, will be doing PT  No GCA symptoms     6/9/2022:  Presents for f/u of PMR likely progressed RA/spondylarthritis  Blood work still shows slightly elevated CRP of 1.94.  Negative RF, CRP and HLA-B27  Has now been having more pain  involving her left collarbone, shoulder and even her right Achilles tendon.  Continues to have swelling in R achilles tendon. Doing PT but not helping, it was worse over the weekend.  L collar bone and shoulder are doing well  On prednisone 9 mg daily  Having some irration in the eyes, feels inflamed, no blurry vision, no GCA symptoms  Having some acid issues in the stomach  Worsening fatigue     8/3/2022:  Presents for f/u of PMR likely progressed RA/spondylarthritis  Blood work in April shows slightly elevated CRP of 1.94.  Negative RF, CRP and HLA-B27  Now down to prednisone 8 mg daily  Also on methotrexate 6 pills weekly and FA daily, on it for about 6 weeks now  Last week felt good, less stiffness and fatigue  But this week had some stiffness in hips   Some pain and stiffness in knees, but not bad, no swelling  Fatigue has improved a little  R achilles tendon pain and swelling has improved  Also having some indigestion in upper abdomen, feels bloating. Normals stools, no nausea or vomiting. Taking pepcid and gasX as needed  Also having a lot of dry eyes, saw eye doctor and started systane twice a day. Its helping the dry eyes  Also has chronic lower back pain      11/7/0222:  Presents for f/u of PMR likely progressed RA/spondylarthritis  Blood work in April shows slightly elevated CRP of 1.94.  Negative RF, CRP and HLA-B27  Now down to prednisone 4 mg daily  Also on methotrexate 6 pills weekly and FA daily  Recent blood work shows normal CBC, CRP slightly elevated at 1.28  Felt really good for 3 weeks in October, was on prednisone 5 mg daily as that time  Joint pain has improved significantly, still has stiffness though but not pain in the joints, no swelling in the joints   Able to raise both shoulders. No achilles pain or swelling  Also having a lot of fatigue for the last week    1/12/2023:  Presents for f/u of PMR likely progressed RA/spondylarthritis  Blood work in April shows slightly elevated CRP of  1.94.  Negative RF, CRP and HLA-B27  He went down to prednisone 2 mg daily on 12/25/2022 and had worsening pain in her hips and shoulders.  She would hardly lift her arms, wash her face even walk her dog due to the symptoms.  She went back up to 4 mg daily and her symptoms improved significantly  Also on methotrexate 6 pills weekly and folic acid every day  Blood work showed normal ESR but CRP was increased 2.32.  Back in August it was normal.  When on methotrexate and prednisone 5 mg and was doing really well. Even on 3 mg and 4 mg having some symptoms.   Has had a lot of stress in the last 2 mos also.  Having stiffness in the hips, hard to walk. Also having some pain in r shoulder. 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 Left hand feels like its swollen at times  Having more dry eyes, using otc eye drops now  Shoulder pain has now gone, some stiffness in hips now   Does not have an heel pain anymore   Blood work showing elevated CRP 1.12  Bone density was done March and normal    7/20/2023:  Presents for f/u of PMR likely progressed RA/spondylarthritis  Currently on methotrexate 8 pills weekly and prednisone 5 mg daily  Recent blood work shows mildly elevated CRP 1.11  At times will have fatigue  Has had some hip pain when sleeping but resolved  No  stiffness in the morning but has stiffness after prolonged siting or after car ride  Overall doing better    10/30/2023:  Presents for f/u of PMR likely progressed RA/spondylarthritis  Currently on methotrexate 8 pills weekly and prednisone 5 mg daily  Recent blood work shows normal inflammatory markers  Starting in Aug 2023 due to some left ear pain and headache and was given doxycycline and now having ringing in both ears since then.   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 When laying on the right side can have some pain in right hp  Hands are stable  Some mild right knee pain  Achilles pain has been stable  Shoulders are good    3/4/2024:  Presents for f/u of PMR likely progressed RA/spondylarthritis  Currently on methotrexate 8 pills weekly and prednisone 5 mg daily  Recent blood work shows mildly elevated CRP 2.10 mg/dL  In January had a sinus/respiratory infection and had it for about 1 month and since then having some pain in left hip  Has to  left hip to put in the car, hard to step with the right leg first. Hard to bend at the right  hip. Also hard to sleep due to pain in the right hip  Having some pain in the right shoulder, flexion and reaching in the cabinet is difficult. Has no issue with r shoulder abduction.   Having worsening pain in the neck  Minimal pain in the hands     6/12/2024:  Presents for f/u of PMR likely progressed RA/spondylarthritis  Currently on methotrexate 8 pills weekly and prednisone 5 mg daily  She was having worsening right hip pain.  MRI of the hip showed moderate to severe osseous edema including contusion, reactive subchondral edema/stress reaction  in the hip joint.  Also showed moderately severe gluteus minimus and anterior gluteus medius tendinosis of the greater trochanteric bursa  She was following with physiatry right acetabulum stress reaction/stress fracture  She had a diagnostic injection with minimal improvement.  They were concerned about her bone health.  They are recommending conservative treatment but if no improvement then a hip replacement  Had bone density done March 2023 which was essentially normal, left femoral neck T-score -0.4, lumbar spine -0.1  Continues to have pain in right hip, has to use walker now   Recent blood work showing mildly elevated CRP 1.00  She also tried PT but right hip pain worsened   Having some mild hand pain and some right knee pain but not severe    9/24/2024:  Presents for f/u of PMR likely progressed RA/spondylarthritis  Currently on methotrexate 8 pills weekly and prednisone 5 mg daily  She has R hip pain and is following with physiatry right acetabulum stress reaction/stress fracture  Continues to have right hip pain, she had a cortisone injection but did not help  She had a 2nd opinion with orthopedic and stated she is not a candidate for surgical intervention   She will be seeing physiatry Dr. Leal  Continues to have pain in the right hip, using a walker and hard to get up at times. When putting weight on it causes more pain  Minimal pain in the left hip  Some pain in the thighs  Other joints are doing well  Recent blood work shows normal kidney and liver test and normal inflammation markers  She was off methotrexate for 3 weeks due to diverticulitis and was placed on Abx             HISTORY:  No past medical history on file.   Social Hx Reviewed   Family Hx Reviewed     Medications (Active prior to today's visit):  Current Outpatient Medications   Medication Sig Dispense Refill    METHOTREXATE 2.5 MG Oral Tab TAKE 8 TABLETS BY MOUTH ONCE A WEEK 104 tablet 0    predniSONE 10 MG Oral Tab 30 mg daily x3 days  then 20 mg daily x3 days then 10 mg daily x3 days then stop 20 tablet 0    metFORMIN  MG Oral Tablet 24 Hr Take 1 tablet (500 mg total) by mouth daily.      predniSONE 5 MG Oral Tab Take 1 tablet (5 mg total) by mouth daily. 90 tablet 1    folic acid 1 MG Oral Tab Take 1 tablet (1 mg total) by mouth daily. (Patient not taking: Reported on 6/12/2024) 90 tablet 3    methotrexate 2.5 MG Oral Tab Take 8 tablets (20 mg total) by mouth once a week. 104 tablet 1    folic acid 1 MG Oral Tab Take 1 tablet (1 mg total) by mouth daily. 90 tablet 2    levothyroxine 112 MCG Oral Tab Take 1 tablet (112 mcg total) by mouth before breakfast. 1/2 tab on Sundays      Fexofenadine HCl (ALLEGRA ALLERGY OR) Take by mouth as needed.      ascorbic acid 1000 MG Oral Tab Take 1 tablet (1,000 mg total) by mouth daily.      omega-3 fatty acids 1000 MG Oral Cap Take 3,000 mg by mouth daily.      magnesium 250 MG Oral Tab Take 1 tablet (250 mg total) by mouth.      Fluticasone Propionate 50 MCG/ACT Nasal Suspension Flonase 50 mcg/actuation nasal spray,suspension   Spray 2 sprays every day by nasal route for 30 days.      Multiple Vitamins-Minerals (CENTRUM SILVER ULTRA WOMENS) Oral Tab Take by mouth.      B Complex Vitamins (VITAMIN B COMPLEX OR) Inject as directed.      Cholecalciferol (VITAMIN D) 50 MCG (2000 UT) Oral Tab Take by mouth 2 (two) times a day.      Menaquinone-7 (VITAMIN K2 OR) Take by mouth.      Calcium Carbonate-Vit D-Min (CALTRATE 600+D PLUS OR) Take by mouth.      Krill Oil 500 MG Oral Cap Take by mouth.      Probiotic Product (Attracta OR) Take by mouth.      Lactobacillus (ACIDOPHOLUS OR) Take by mouth.      GLUCOSAMINE-CHONDROITIN OR Take by mouth.      Albuterol Sulfate  (90 Base) MCG/ACT Inhalation Aero Soln as needed for Shortness of Breath.       .cmed  Allergies:  Allergies   Allergen Reactions    Penicillins RASH    Sulfa Antibiotics RASH       ROS:   All other ROS are negative.      PHYSICAL EXAM:   GEN: AAOx3, NAD  HEENT: EOMI, PERRLA, no injection or icterus, oral mucosa moist, no oral lesions. No lymphadenopathy. No facial rash  CVS: RRR, no murmurs rubs or gallops. Equal 2+ distal pulses.   LUNGS: CTAB, no increased work of breathing  ABDOMEN:  soft NT/ND, +BS, no HSM  SKIN: No rashes or skin lesions. No nail findings  MSK:  Cervical spine: FROM  Hands: no synovitis in DIP, PIP and MCP, strong full fists  Wrist: FROM, no pain or swelling or warmth on palpation  Elbow: FROM, no pain or swelling or warmth on palpation  Shoulders: R shoulder has FROM  Hip: Right hip limited internal and external rotation  Knees: FROM, no warmth or effusion present. No pain with ROM.   Ankles: R achilles swelling improved  Feet: no pain with MTP squeeze, no toe swelling or pain or warmth on palpation with FROM  Spine: no lumbar or sacral pain on palpation.  NEURO: Cranial nerves II-XII intact grossly. 5/5 strength throughout in both upper and lower extremities, sensation intact.  PSYCH: normal mood       LABS:     Component      Latest Ref Rng & Units 9/15/2021   Quantiferon TB NIL      IU/mL 0.03   Quantiferon-TB1 Minus NIL      IU/mL 0.08   Quantiferon-TB2 Minus NIL      IU/mL 0.02   Quantiferon TB Mitogen minus NIL      IU/mL >10.00   QTB.RESULT      Negative Negative   HBSAg Screen      Nonreactive  Nonreactive   Hbsag Screen Index       <0.10   HEPATITIS B SURFACE AB QUAL      Nonreactive  Nonreactive   HEPATITIS B SURFACE AB QUANT      mIU/mL <3.10   C-Citrullinated Peptide IgG AB      0.0 - 6.9 U/mL 0.5   RHEUMATOID FACTOR      <15 IU/mL <10   HEPATITIS B CORE AB, TOTAL      Nonreactive  Nonreactive   GLUCOSE-6-PHOSPHATE DEHYDROGEN      9.9 - 16.6 U/g Hb 11.7   HCV AB      Nonreactive  Nonreactive     Imaging:     None    ASSESSMENT/PLAN:     B/L shoulder stiffness 2/2 PMR now likely progressed to RA/spondylarthritis- stable  - Symptoms are classic for PMR.  Bilateral shoulder stiffness.  Elevated  CRP and response to prednisone.    - No giant cell arteritis symptoms  - On methotrexate 8 pills weekly and folic acid daily and predisone 5 mg daily  - Blood work reviewed with patient, will continue to monitor every 3 mos     Right hip pain 2/2 stress fracture and severe gluteas minimus tendonesis and bursitis   - MRI reviewed  - seeing physiatry, recommending conservative treatment and maybe do hip replacement, saw ortho for 2nd opinion and did not recommend hip replacement  - s/p right hip cortisone injection 6/2024 with no improvement   - she will be seeing physiatry Dr. Leal  - recent bone density 3/2023 was normal    Recent diverticulitis  - treated with Abx and resolved     Right Achilles tendinitis likely 2/2 Above- improved  - continue above treatment     Pt will f/u in 3 mos     There is a longitudinal care relationship with me, the care plan reflects the ongoing nature of the continuous relationship of care, and the medical record indicates that there is ongoing treatment of a serious/complex medical condition which I am currently managing.  is Applicable.     Vida Cristina MD  9/24/2024  11:55 AM

## 2024-09-26 ENCOUNTER — OFFICE VISIT (OUTPATIENT)
Dept: PHYSICAL MEDICINE AND REHAB | Facility: CLINIC | Age: 74
End: 2024-09-26
Payer: MEDICARE

## 2024-09-26 VITALS — HEIGHT: 61 IN | BODY MASS INDEX: 35.21 KG/M2 | RESPIRATION RATE: 16 BRPM | WEIGHT: 186.5 LBS

## 2024-09-26 DIAGNOSIS — M16.11 PRIMARY OSTEOARTHRITIS OF RIGHT HIP: ICD-10-CM

## 2024-09-26 DIAGNOSIS — M25.551 CHRONIC PAIN OF RIGHT HIP: Primary | ICD-10-CM

## 2024-09-26 DIAGNOSIS — M84.359D: ICD-10-CM

## 2024-09-26 DIAGNOSIS — G89.29 CHRONIC PAIN OF RIGHT HIP: Primary | ICD-10-CM

## 2024-09-26 DIAGNOSIS — M54.16 LUMBAR RADICULOPATHY: ICD-10-CM

## 2024-09-26 PROCEDURE — 99204 OFFICE O/P NEW MOD 45 MIN: CPT | Performed by: PHYSICAL MEDICINE & REHABILITATION

## 2024-09-26 NOTE — PROGRESS NOTES
Low Back Pain H & P    Chief Complaint:    Chief Complaint   Patient presents with    New Patient     New patient, right hand dominant,  here for right hip pain that travels also to the right groin and right knee. Pain started in February. Denies any accidents or injury. Tried Prednisone (did not help for the hip). Tried PT which made the pain worst. Patient takes Tylenol OTC. Patient had a steroid shot on June 14th which made the pain worst. Patient also tried 6 ortho laser treatments which were not helpful. Patient had MRI of R. Hip done on  4/9/24. Pain sharp, worst with walking, sitting. Pain 6/10.        HPI:  Miladys Quarles is a 74 year old year old right handed female with a prior history of PMR that developed right after the 1st COVID injection and then returned right after she had the second one.  The PMR resolved 17 days after she had the 1st injection.  After the second injection in April and she developed the PMR, she started treatment for the PMR in August of 2021 after seeing Dr. Cristina from rheumatology.  She started on a high dose of the prednisone and then she has been stable at 5 mg a day.  She will periodically have a flare up of her pain and will require a burst of prednisone.  Sometime since the 2nd COVID injection, she was diagnosed with RA and spondyloarthropathy.  Then in 2/2024, she had a flare up with an increased CRP.  She was given a burst of high dose prednisone which resolved all of her pain except for the right groin pain.  She is not able to left her right foot and leg now due to weakness with pain. She has had a MRI of the hip and a right hip x-ray.  She has seen 2 orthopedic surgeons.  She was told that she did not need to have hip replacement surgery at this time the first time that she saw hip.  The second time that she saw him, he ordered a right hip injection which did not help.  She had one with an anesthetic and one with steroid both of which did not help.  The first  orthopedic surgeon was at [facility] and she got a second opinion at [facility] and she was told to go to pain management.  She had 14 sessions of the PT for the right hip at [facility] which increased her pain.  Right hip traction increased her pain.  She had ortho laser light treatments in [place] which gave her some temporary relief.      Description of the Pain  The pain is located in the right groin, lateral hip, and buttock.    The pain radiates to the right anterior thigh.  She can have right knee pain at times.  The hip will pop at times.    The pain at its best is 0/10. The pain at its worst is 10/10. The pain is currently  0/10.  The pain is described as a(n) sharp sensation.    The pain is worse when bending, standing, walking, and at night time .    The pain is better when extending.  There is no numbness.  There is no tingling in the legs.  There is weakness in the right leg.     Past Medical History   Past Medical History:    Fibromyalgia    possibly    Hyperlipidemia       Past Surgical History   History reviewed. No pertinent surgical history.    Family History   History reviewed. No pertinent family history.    Social History   Social History     Socioeconomic History    Marital status:      Spouse name: Not on file    Number of children: Not on file    Years of education: Not on file    Highest education level: Not on file   Occupational History    Not on file   Tobacco Use    Smoking status: Former     Current packs/day: 1.50     Average packs/day: 1.5 packs/day for 25.0 years (37.5 ttl pk-yrs)     Types: Cigarettes    Smokeless tobacco: Never    Tobacco comments:     Quit about 30 years ago   Vaping Use    Vaping status: Never Used   Substance and Sexual Activity    Alcohol use: Not Currently     Comment: Rare    Drug use: Never    Sexual activity: Not on file   Other Topics Concern    Not on file   Social History Narrative    Not on file     Social Determinants of Health      Financial Resource Strain: Not on file   Food Insecurity: Not on file   Transportation Needs: Not on file   Physical Activity: Not on file   Stress: Not on file   Social Connections: Not on file   Housing Stability: At Risk (8/18/2023)    [facility]     Living Situation: Not on file     Housing Problems: Not on file       Review of Systems  Patient-reported ROS  Constitutional  Sleep Disturbance: admits  Chills: denies  Fever: denies  Weight Gain: denies  Weight Loss: denies   Cardiovascular  Chest Pain: denies  Irregular Heartbeat: denies   Respiratory  Painful Breathing: denies  Wheezing: admits (allergies)   Gastrointestinal  Bowel Incontinence: denies  Heartburn: denies  Abdominal Pain: denies  Blood in Stool : admits (hemorrhoids)  Rectal Pain: denies   Hematology  Easy Bruising: denies  Easy Bleeding: denies   Genitourinary  Difficulty Urinating: denies  Bladder Incontinence: denies  Pelvic Pain: denies  Painful Urination: denies   Musculoskeletal  Joint Stiffness: admits  Painful Joints: admits  Tailbone Pain: denies  Swollen Joints: denies   Peripheral Vascular  Swelling of Legs/Feet: denies  Cold Extremities: admits   Skin  Open Sores: denies  Nodules or Lumps: denies  Rash: denies   Neurological  Loss of Strength Since last Visit: admits  Tingling/Numbness: denies  Balance: admits   Psychiatric  Anxiety: denies  Depressed Mood: denies        PE:  The patient does appear in her stated age in no distress.  The patient is well groomed.    Psychiatric:  The patient is alert and oriented x 3.  The patient has a normal affect and mood.      Respiratory:  No acute respiratory distress. Patient does not have a cough.    HEENT:  Extraocular muscles are intact. There is no kern icterus. Pupils are equal, round, and reactive to light. No redness or discharge bilaterally.    Skin:  There are no rashes or lesions.    Vitals:  Vitals:    09/26/24 1520   Resp: 16        Gait:    Gait: right antalgic gait and right compensated trendelenberg gait   Sit to Stand: no difficulty     Lumbar Spine:    Scoliosis: No scoliosis present     Lumbar Spine Palpation:    Spinous Processes: Non-tender for all Spinous Processes   Z-joints: Non-tender for all Z-joints   SIJ: Non-tender for bilateral SIJ   Piriformis Muscle: Non-tender bilateral Piriformis muscles   Greater Trochanteric Bursa: Non-tender for bilateral Greater Trochanteric Bursa     Vascular lower extremity:   Dorsalis pedis pulse-RIGHT 2+   Dorsalis pedis pulse-LEFT 2+   Tibialis posterior pulse-RIGHT 2+   Tibialis posterior pulse-LEFT 2+      Neurological Lower Extremity:    Light Touch Sensation: Intact in bilateral Lower Extremities   LE Muscle Strength: All LE strength measurements 5/5 except:  Hamstring RIGHT:   3+/5   RIGHT plantar reflexes: downward response   LEFT plantar reflexes: downward response   Reflexes: 2+ in bilateral lower extremities     Hip: Hips are stable.    RIGHT hip ROM mild-moderately limited   LEFT hip ROM mild-moderately limited   RIGHT hip flexion Negative pain   LEFT hip flexion Negative pain   RIGHT hip RUBIA test Negative for pain   LEFT hip RUBIA test Negative for pain   RIGHT hip internal rotation Positive for right lateral hip pain with right groin pain after straightening her hip out   LEFT hip internal rotation Negative for pain     Neural Tension Tests Lumbar Spine:  Sitting straight leg raise-RIGHT Negative for pain   Sitting straight leg raise-LEFT Negative for pain   Supine straight leg raise-RIGHT Negative for pain   Supine straight leg raise-LEFT Negative for pain   Slump test-RIGHT Negative for pain   Slump test-LEFT Negative for pain     Lymph Nodes:   Inguinal Lymph Nodes Absent     Radiology Imaging:  I reviewed with the patient her X-ray and MRI of the lumbar spine and hip right from 4/5/2024, 5/8/2024, and 4/9/2024.      Assessment  1. Chronic pain of right hip    2. Stress  fracture of hip with routine healing of the acetabulum    3. Primary osteoarthritis of right hip: mild    4. right L5-S1 radiculopathy      Plan  She will be non-weight bearing for the next 4 weeks.    She will follow up in 4 weeks.    If the pain is not any better, then she will get a new MRI of the right hip.    If the pain is not improving, then she will rial low dose Naltrexone.    The patient understands and agrees with the stated plan.    Jitendra Leal MD  9/26/2024

## 2024-09-26 NOTE — PATIENT INSTRUCTIONS
Plan  She will be non-weight bearing for the next 4 weeks.    She will follow up in 4 weeks.    If the pain is not any better, then she will get a new MRI of the right hip.    If the pain is not improving, then she will rial low dose Naltrexone.

## 2024-10-31 ENCOUNTER — OFFICE VISIT (OUTPATIENT)
Dept: PHYSICAL MEDICINE AND REHAB | Facility: CLINIC | Age: 74
End: 2024-10-31
Payer: MEDICARE

## 2024-10-31 DIAGNOSIS — G89.29 CHRONIC PAIN OF RIGHT HIP: ICD-10-CM

## 2024-10-31 DIAGNOSIS — M54.16 LUMBAR RADICULOPATHY: ICD-10-CM

## 2024-10-31 DIAGNOSIS — M16.11 PRIMARY OSTEOARTHRITIS OF RIGHT HIP: Primary | ICD-10-CM

## 2024-10-31 DIAGNOSIS — M84.359D: ICD-10-CM

## 2024-10-31 DIAGNOSIS — M25.551 CHRONIC PAIN OF RIGHT HIP: ICD-10-CM

## 2024-10-31 DIAGNOSIS — M35.3 PMR (POLYMYALGIA RHEUMATICA) (HCC): ICD-10-CM

## 2024-10-31 PROCEDURE — 99214 OFFICE O/P EST MOD 30 MIN: CPT | Performed by: PHYSICAL MEDICINE & REHABILITATION

## 2024-10-31 NOTE — PROGRESS NOTES
Low Back Pain H & P    Chief Complaint:   Chief Complaint   Patient presents with    Follow - Up     LOV 9/26/24. Patient presents for f/u on right hip. Pain 0/10. Denies N/T. Denies weakness. Takes Tylenol for pain with relief.     Nursing note reviewed and verified.    Patient was last seen on 9/26/2024.  She states that she feels about the same.  She has been non-weight bearing on the right leg since she was last here.  She is scheduled to have her MRI of the right hip on 11/9/2024.    Description of the Pain  The pain is located in the right groin.    The pain does not radiate..  The pain is worse when bending, standing, walking, going from sitting to standing, going from standing to sitting, and putting weight on the right leg and active movement of the right hip. .    The pain is better sitting and passive movement of the right hip.  There is no numbness.  There is no tingling in the legs.  There is weakness in the right leg.     Past Medical History   Past Medical History:    Fibromyalgia    possibly    Hyperlipidemia       Past Surgical History   No past surgical history on file.    Family History   No family history on file.    Social History   Social History     Socioeconomic History    Marital status:      Spouse name: Not on file    Number of children: Not on file    Years of education: Not on file    Highest education level: Not on file   Occupational History    Not on file   Tobacco Use    Smoking status: Former     Current packs/day: 1.50     Average packs/day: 1.5 packs/day for 25.0 years (37.5 ttl pk-yrs)     Types: Cigarettes    Smokeless tobacco: Never    Tobacco comments:     Quit about 30 years ago   Vaping Use    Vaping status: Never Used   Substance and Sexual Activity    Alcohol use: Not Currently     Comment: Rare    Drug use: Never    Sexual activity: Not on file   Other Topics Concern    Not on file   Social History Narrative    Not on file     Social Drivers of Health     Financial  Resource Strain: Not on file   Food Insecurity: Not on file   Transportation Needs: Not on file   Physical Activity: Not on file   Stress: Not on file   Social Connections: Not on file   Housing Stability: At Risk (8/18/2023)    Received from [employer]    [facility]     Living Situation: Not on file     Housing Problems: Not on file       PE:  The patient does appear in her stated age in no distress.  The patient is well groomed.    Psychiatric:  The patient is alert and oriented x 3.  The patient has a normal affect and mood.      Respiratory:  No acute respiratory distress. Patient does not have a cough.    HEENT:  Extraocular muscles are intact. There is no kern icterus. Pupils are equal, round, and reactive to light. No redness or discharge bilaterally.    Skin:  There are no rashes or lesions.    Vitals:  There were no vitals filed for this visit.    Gait:    Gait: Normal gait with rolling walker   Sit to Stand: no difficulty      Neurological Lower Extremity:    Light Touch Sensation: Intact in bilateral Lower Extremities   LE Muscle Strength: All LE strength measurements 5/5 except:  Hamstring RIGHT:   4/5   Reflexes: 2+ in bilateral lower extremities     Hip: Hips are stable.    RIGHT hip ROM moderately limited   LEFT hip ROM moderate-severely limited   Right hip flexion Positive right groin pain   LEFT hip flexion Negative pain   RIGHT hip RUBIA test Positive for right groin pain   LEFT hip RUBIA test Negative for pain   RIGHT hip internal rotation Positive for right groin pain   LEFT hip internal rotation Negative for pain     Radiology Imaging:  I reviewed with the patient her MRI of the hip right from 4/9/2024.      Assessment  1. Primary osteoarthritis of right hip: mild    2. Stress fracture of hip with routine healing of the acetabulum    3. Chronic pain of right hip    4. right L5-S1 radiculopathy    5. PMR (polymyalgia rheumatica) ([facility])         Plan  She will get the new MRI scan  and then I will review it and decide if she needs any further therapy or injections.    She will follow up in 2-4 months depending on the above.    The patient understands and agrees with the stated plan.  Jitendra Leal MD  10/31/2024

## 2024-11-06 RX ORDER — METHOTREXATE 2.5 MG/1
20 TABLET ORAL WEEKLY
Qty: 104 TABLET | Refills: 0 | Status: SHIPPED | OUTPATIENT
Start: 2024-11-06

## 2024-11-06 NOTE — TELEPHONE ENCOUNTER
LOV: 9/24/24  Future Appointments   Date Time Provider Department Center   11/9/2024 12:30 PM [facility] RM2 (3T WIDE) [facility]  [place]   1/3/2025  1:00 PM Vida Cristina MD [facility]     Labs: AST 18  ALT 21  10/2/24  ASSESSMENT/PLAN:      B/L shoulder stiffness 2/2 PMR now likely progressed to RA/spondylarthritis- stable  - Symptoms are classic for PMR.  Bilateral shoulder stiffness.  Elevated CRP and response to prednisone.    - No giant cell arteritis symptoms  - On methotrexate 8 pills weekly and folic acid daily and predisone 5 mg daily  - Blood work reviewed with patient, will continue to monitor every 3 mos      Right hip pain 2/2 stress fracture and severe gluteas minimus tendonesis and bursitis   - MRI reviewed  - seeing physiatry, recommending conservative treatment and maybe do hip replacement, saw ortho for 2nd opinion and did not recommend hip replacement  - s/p right hip cortisone injection 6/2024 with no improvement   - she will be seeing physiatry Dr. Leal  - recent bone density 3/2023 was normal     Recent diverticulitis  - treated with Abx and resolved      Right Achilles tendinitis likely 2/2 Above- improved  - continue above treatment      Pt will f/u in 3 mos      There is a longitudinal care relationship with me, the care plan reflects the ongoing nature of the continuous relationship of care, and the medical record indicates that there is ongoing treatment of a serious/complex medical condition which I am currently managing.  is Applicable.      Vida Cristina MD  9/24/2024  11:55 AM

## 2024-11-09 ENCOUNTER — HOSPITAL ENCOUNTER (OUTPATIENT)
Dept: MRI IMAGING | Facility: HOSPITAL | Age: 74
Discharge: HOME | End: 2024-11-09
Attending: PHYSICAL MEDICINE & REHABILITATION
Payer: MEDICARE

## 2024-11-09 DIAGNOSIS — G89.29 CHRONIC PAIN OF RIGHT HIP: ICD-10-CM

## 2024-11-09 DIAGNOSIS — M84.359D: ICD-10-CM

## 2024-11-09 DIAGNOSIS — M25.551 CHRONIC PAIN OF RIGHT HIP: ICD-10-CM

## 2024-11-09 DIAGNOSIS — M16.11 PRIMARY OSTEOARTHRITIS OF RIGHT HIP: ICD-10-CM

## 2024-11-09 PROCEDURE — 73721 MRI JNT OF LWR EXTRE W/O DYE: CPT | Performed by: PHYSICAL MEDICINE & REHABILITATION

## 2024-11-11 ENCOUNTER — TELEPHONE (OUTPATIENT)
Dept: PHYSICAL MEDICINE AND REHAB | Facility: CLINIC | Age: 74
End: 2024-11-11

## 2024-11-11 PROBLEM — M76.891 TENDONITIS INVOLVING HIP ABDUCTORS, RIGHT: Status: ACTIVE | Noted: 2024-11-11

## 2024-11-11 NOTE — TELEPHONE ENCOUNTER
Patient completed right hip MRI on 11/9/24. Patient asking for results and next steps.     Informed patient message would be relayed to  for recommendations.    Patient understood and was appreciative.

## 2024-11-12 NOTE — TELEPHONE ENCOUNTER
She has moderate-severe degenerative changes of the right hip.  She will need to see the orthopedic surgeon again.

## 2024-11-13 NOTE — TELEPHONE ENCOUNTER
Patient informed of message below. Patient understood and was appreciative.     Nothing further needed at this time.

## 2025-01-08 ENCOUNTER — LAB ENCOUNTER (OUTPATIENT)
Dept: LAB | Facility: HOSPITAL | Age: 75
End: 2025-01-08
Attending: INTERNAL MEDICINE
Payer: MEDICARE

## 2025-01-08 DIAGNOSIS — Z51.81 MEDICATION MONITORING ENCOUNTER: ICD-10-CM

## 2025-01-08 DIAGNOSIS — M06.09 RHEUMATOID ARTHRITIS OF MULTIPLE SITES WITH NEGATIVE RHEUMATOID FACTOR (HCC): ICD-10-CM

## 2025-01-08 DIAGNOSIS — M35.3 PMR (POLYMYALGIA RHEUMATICA) (HCC): ICD-10-CM

## 2025-01-08 DIAGNOSIS — M25.551 RIGHT HIP PAIN: ICD-10-CM

## 2025-01-08 LAB
ALBUMIN SERPL-MCNC: 4.6 G/DL (ref 3.2–4.8)
ALT SERPL-CCNC: 19 U/L
AST SERPL-CCNC: 20 U/L (ref ?–34)
BASOPHILS # BLD AUTO: 0.09 X10(3) UL (ref 0–0.2)
BASOPHILS NFR BLD AUTO: 0.6 %
CREAT BLD-MCNC: 0.79 MG/DL
CRP SERPL-MCNC: 1.4 MG/DL (ref ?–1)
DEPRECATED RDW RBC AUTO: 48.2 FL (ref 35.1–46.3)
EGFRCR SERPLBLD CKD-EPI 2021: 79 ML/MIN/1.73M2 (ref 60–?)
EOSINOPHIL # BLD AUTO: 0.15 X10(3) UL (ref 0–0.7)
EOSINOPHIL NFR BLD AUTO: 1.1 %
ERYTHROCYTE [DISTWIDTH] IN BLOOD BY AUTOMATED COUNT: 13.6 % (ref 11–15)
ERYTHROCYTE [SEDIMENTATION RATE] IN BLOOD: 13 MM/HR
HCT VFR BLD AUTO: 36.4 %
HGB BLD-MCNC: 11.6 G/DL
IMM GRANULOCYTES # BLD AUTO: 0.08 X10(3) UL (ref 0–1)
IMM GRANULOCYTES NFR BLD: 0.6 %
LYMPHOCYTES # BLD AUTO: 2.52 X10(3) UL (ref 1–4)
LYMPHOCYTES NFR BLD AUTO: 17.6 %
MCH RBC QN AUTO: 31.2 PG (ref 26–34)
MCHC RBC AUTO-ENTMCNC: 31.9 G/DL (ref 31–37)
MCV RBC AUTO: 97.8 FL
MONOCYTES # BLD AUTO: 1.14 X10(3) UL (ref 0.1–1)
MONOCYTES NFR BLD AUTO: 8 %
NEUTROPHILS # BLD AUTO: 10.3 X10 (3) UL (ref 1.5–7.7)
NEUTROPHILS # BLD AUTO: 10.3 X10(3) UL (ref 1.5–7.7)
NEUTROPHILS NFR BLD AUTO: 72.1 %
PLATELET # BLD AUTO: 422 10(3)UL (ref 150–450)
RBC # BLD AUTO: 3.72 X10(6)UL
WBC # BLD AUTO: 14.3 X10(3) UL (ref 4–11)

## 2025-01-08 PROCEDURE — 36415 COLL VENOUS BLD VENIPUNCTURE: CPT

## 2025-01-08 PROCEDURE — 82565 ASSAY OF CREATININE: CPT

## 2025-01-08 PROCEDURE — 86140 C-REACTIVE PROTEIN: CPT

## 2025-01-08 PROCEDURE — 85652 RBC SED RATE AUTOMATED: CPT

## 2025-01-08 PROCEDURE — 84460 ALANINE AMINO (ALT) (SGPT): CPT

## 2025-01-08 PROCEDURE — 84450 TRANSFERASE (AST) (SGOT): CPT

## 2025-01-08 PROCEDURE — 82040 ASSAY OF SERUM ALBUMIN: CPT

## 2025-01-08 PROCEDURE — 85025 COMPLETE CBC W/AUTO DIFF WBC: CPT

## 2025-01-27 RX ORDER — METHOTREXATE 2.5 MG/1
20 TABLET ORAL WEEKLY
Qty: 104 TABLET | Refills: 0 | Status: SHIPPED | OUTPATIENT
Start: 2025-01-27

## 2025-01-27 RX ORDER — FOLIC ACID 1 MG/1
1 TABLET ORAL DAILY
Qty: 90 TABLET | Refills: 3 | Status: SHIPPED | OUTPATIENT
Start: 2025-01-27

## 2025-01-27 NOTE — TELEPHONE ENCOUNTER
LOV: 9/24/24  Future Appointments   Date Time Provider Department Center   4/23/2025  1:40 PM Vida Cristina MD [facility]   8/1/2025  1:00 PM [facility]     Labs: AST 20 ALT 19 1/8/25  ASSESSMENT/PLAN:      B/L shoulder stiffness 2/2 PMR now likely progressed to RA/spondylarthritis- stable  - Symptoms are classic for PMR.  Bilateral shoulder stiffness.  Elevated CRP and response to prednisone.    - No giant cell arteritis symptoms  - On methotrexate 8 pills weekly and folic acid daily and predisone 5 mg daily  - Blood work reviewed with patient, will continue to monitor every 3 mos      Right hip pain 2/2 stress fracture and severe gluteas minimus tendonesis and bursitis   - MRI reviewed  - seeing physiatry, recommending conservative treatment and maybe do hip replacement, saw ortho for 2nd opinion and did not recommend hip replacement  - s/p right hip cortisone injection 6/2024 with no improvement   - she will be seeing physiatry Dr. Leal  - recent bone density 3/2023 was normal     Recent diverticulitis  - treated with Abx and resolved      Right Achilles tendinitis likely 2/2 Above- improved  - continue above treatment      Pt will f/u in 3 mos      There is a longitudinal care relationship with me, the care plan reflects the ongoing nature of the continuous relationship of care, and the medical record indicates that there is ongoing treatment of a serious/complex medical condition which I am currently managing.  is Applicable.      Vida Cristina MD  9/24/2024  11:55 AM

## 2025-01-27 NOTE — TELEPHONE ENCOUNTER
LOV: 9/24/24  Future Appointments   Date Time Provider Department Center   4/23/2025  1:40 PM Vida Cristina MD [facility]   8/1/2025  1:00 PM [facility]     ASSESSMENT/PLAN:      B/L shoulder stiffness 2/2 PMR now likely progressed to RA/spondylarthritis- stable  - Symptoms are classic for PMR.  Bilateral shoulder stiffness.  Elevated CRP and response to prednisone.    - No giant cell arteritis symptoms  - On methotrexate 8 pills weekly and folic acid daily and predisone 5 mg daily  - Blood work reviewed with patient, will continue to monitor every 3 mos      Right hip pain 2/2 stress fracture and severe gluteas minimus tendonesis and bursitis   - MRI reviewed  - seeing physiatry, recommending conservative treatment and maybe do hip replacement, saw ortho for 2nd opinion and did not recommend hip replacement  - s/p right hip cortisone injection 6/2024 with no improvement   - she will be seeing physiatry Dr. Leal  - recent bone density 3/2023 was normal     Recent diverticulitis  - treated with Abx and resolved      Right Achilles tendinitis likely 2/2 Above- improved  - continue above treatment      Pt will f/u in 3 mos      There is a longitudinal care relationship with me, the care plan reflects the ongoing nature of the continuous relationship of care, and the medical record indicates that there is ongoing treatment of a serious/complex medical condition which I am currently managing.  is Applicable.      Vida Cristina MD  9/24/2024  11:55 AM

## 2025-03-04 RX ORDER — PREDNISONE 5 MG/1
5 TABLET ORAL DAILY
Qty: 90 TABLET | Refills: 0 | Status: SHIPPED | OUTPATIENT
Start: 2025-03-04

## 2025-03-04 NOTE — TELEPHONE ENCOUNTER
Requested Prescriptions     Pending Prescriptions Disp Refills    PREDNISONE 5 MG Oral Tab [Pharmacy Med Name: predniSONE 5 MG Oral Tablet] 90 tablet 0     Sig: Take 1 tablet by mouth once daily     LOV: 9/24/24  Future Appointments   Date Time Provider Department Center   4/23/2025  1:40 PM Vida Cristina MD [facility]   8/1/2025  1:00 PM [facility]     Labs:   Component      Latest Ref Rng 1/8/2025   WBC      4.0 - 11.0 x10(3) uL 14.3 (H)    RBC      3.80 - 5.30 x10(6)uL 3.72 (L)    Hemoglobin      12.0 - 16.0 g/dL 11.6 (L)    Hematocrit      35.0 - 48.0 % 36.4    MCV      80.0 - 100.0 fL 97.8    MCH      26.0 - 34.0 pg 31.2    MCHC      31.0 - 37.0 g/dL 31.9    RDW-SD      35.1 - 46.3 fL 48.2 (H)    RDW      11.0 - 15.0 % 13.6    Platelet Count      150.0 - 450.0 10(3)uL 422.0    Prelim Neutrophil Abs      1.50 - 7.70 x10 (3) uL 10.30 (H)    Neutrophils Absolute      1.50 - 7.70 x10(3) uL 10.30 (H)    Lymphocytes Absolute      1.00 - 4.00 x10(3) uL 2.52    Monocytes Absolute      0.10 - 1.00 x10(3) uL 1.14 (H)    Eosinophils Absolute      0.00 - 0.70 x10(3) uL 0.15    Basophils Absolute      0.00 - 0.20 x10(3) uL 0.09    Immature Granulocyte Absolute      0.00 - 1.00 x10(3) uL 0.08    Neutrophils %      % 72.1    Lymphocytes %      % 17.6    Monocytes %      % 8.0    Eosinophils %      % 1.1    Basophils %      % 0.6    Immature Granulocyte %      % 0.6    CREATININE      0.55 - 1.02 mg/dL 0.79    EGFR      >=60 mL/min/1.73m2 79    Albumin      3.2 - 4.8 g/dL 4.6    ALT (SGPT)      10 - 49 U/L 19    AST (SGOT)      <34 U/L 20    C-REACTIVE PROTEIN      <1.00 mg/dL 1.40 (H)    SED RATE      0 - 30 mm/Hr 13       Legend:  (H) High  (L) Low      Instructions    You were seen for RA  Continue methotrexate, folic acid, prednisone  See Dr. Leal for the hip pain  Blood work looked good   Blood work in 3-4 mos  See me in 3-4 mos

## 2025-04-14 ENCOUNTER — LAB ENCOUNTER (OUTPATIENT)
Dept: LAB | Facility: HOSPITAL | Age: 75
End: 2025-04-14
Attending: INTERNAL MEDICINE
Payer: MEDICARE

## 2025-04-14 DIAGNOSIS — M35.3 PMR (POLYMYALGIA RHEUMATICA) (HCC): ICD-10-CM

## 2025-04-14 DIAGNOSIS — M06.09 RHEUMATOID ARTHRITIS OF MULTIPLE SITES WITH NEGATIVE RHEUMATOID FACTOR (HCC): ICD-10-CM

## 2025-04-14 DIAGNOSIS — M25.551 RIGHT HIP PAIN: ICD-10-CM

## 2025-04-14 DIAGNOSIS — Z51.81 MEDICATION MONITORING ENCOUNTER: ICD-10-CM

## 2025-04-14 LAB
ALBUMIN SERPL-MCNC: 4.8 G/DL (ref 3.2–4.8)
ALT SERPL-CCNC: 35 U/L (ref 10–49)
AST SERPL-CCNC: 28 U/L (ref ?–34)
BASOPHILS # BLD AUTO: 0.1 X10(3) UL (ref 0–0.2)
BASOPHILS NFR BLD AUTO: 1 %
CREAT BLD-MCNC: 0.87 MG/DL (ref 0.55–1.02)
CRP SERPL-MCNC: 1.1 MG/DL (ref ?–1)
DEPRECATED RDW RBC AUTO: 48.7 FL (ref 35.1–46.3)
EGFRCR SERPLBLD CKD-EPI 2021: 71 ML/MIN/1.73M2 (ref 60–?)
EOSINOPHIL # BLD AUTO: 0.11 X10(3) UL (ref 0–0.7)
EOSINOPHIL NFR BLD AUTO: 1.1 %
ERYTHROCYTE [DISTWIDTH] IN BLOOD BY AUTOMATED COUNT: 14.4 % (ref 11–15)
ERYTHROCYTE [SEDIMENTATION RATE] IN BLOOD: 8 MM/HR (ref 0–30)
HCT VFR BLD AUTO: 42.5 % (ref 35–48)
HGB BLD-MCNC: 14.2 G/DL (ref 12–16)
IMM GRANULOCYTES # BLD AUTO: 0.05 X10(3) UL (ref 0–1)
IMM GRANULOCYTES NFR BLD: 0.5 %
LYMPHOCYTES # BLD AUTO: 2.75 X10(3) UL (ref 1–4)
LYMPHOCYTES NFR BLD AUTO: 26.9 %
MCH RBC QN AUTO: 31.3 PG (ref 26–34)
MCHC RBC AUTO-ENTMCNC: 33.4 G/DL (ref 31–37)
MCV RBC AUTO: 93.8 FL (ref 80–100)
MONOCYTES # BLD AUTO: 0.63 X10(3) UL (ref 0.1–1)
MONOCYTES NFR BLD AUTO: 6.2 %
NEUTROPHILS # BLD AUTO: 6.6 X10 (3) UL (ref 1.5–7.7)
NEUTROPHILS # BLD AUTO: 6.6 X10(3) UL (ref 1.5–7.7)
NEUTROPHILS NFR BLD AUTO: 64.3 %
PLATELET # BLD AUTO: 328 10(3)UL (ref 150–450)
RBC # BLD AUTO: 4.53 X10(6)UL (ref 3.8–5.3)
WBC # BLD AUTO: 10.2 X10(3) UL (ref 4–11)

## 2025-04-14 PROCEDURE — 84460 ALANINE AMINO (ALT) (SGPT): CPT

## 2025-04-14 PROCEDURE — 82040 ASSAY OF SERUM ALBUMIN: CPT

## 2025-04-14 PROCEDURE — 86140 C-REACTIVE PROTEIN: CPT

## 2025-04-14 PROCEDURE — 85025 COMPLETE CBC W/AUTO DIFF WBC: CPT

## 2025-04-14 PROCEDURE — 85652 RBC SED RATE AUTOMATED: CPT

## 2025-04-14 PROCEDURE — 84450 TRANSFERASE (AST) (SGOT): CPT

## 2025-04-14 PROCEDURE — 82565 ASSAY OF CREATININE: CPT

## 2025-04-14 PROCEDURE — 36415 COLL VENOUS BLD VENIPUNCTURE: CPT

## 2025-04-23 ENCOUNTER — OFFICE VISIT (OUTPATIENT)
Age: 75
End: 2025-04-23
Payer: MEDICARE

## 2025-04-23 VITALS
WEIGHT: 185 LBS | HEIGHT: 61 IN | SYSTOLIC BLOOD PRESSURE: 134 MMHG | DIASTOLIC BLOOD PRESSURE: 80 MMHG | HEART RATE: 66 BPM | BODY MASS INDEX: 34.93 KG/M2

## 2025-04-23 DIAGNOSIS — M06.09 RHEUMATOID ARTHRITIS OF MULTIPLE SITES WITH NEGATIVE RHEUMATOID FACTOR (HCC): ICD-10-CM

## 2025-04-23 DIAGNOSIS — M35.3 PMR (POLYMYALGIA RHEUMATICA) (HCC): Primary | ICD-10-CM

## 2025-04-23 DIAGNOSIS — Z51.81 MEDICATION MONITORING ENCOUNTER: ICD-10-CM

## 2025-04-23 PROCEDURE — G2211 COMPLEX E/M VISIT ADD ON: HCPCS | Performed by: INTERNAL MEDICINE

## 2025-04-23 PROCEDURE — 99214 OFFICE O/P EST MOD 30 MIN: CPT | Performed by: INTERNAL MEDICINE

## 2025-04-23 RX ORDER — METHOTREXATE 2.5 MG/1
20 TABLET ORAL WEEKLY
Qty: 104 TABLET | Refills: 1 | Status: SHIPPED | OUTPATIENT
Start: 2025-04-23

## 2025-04-23 RX ORDER — PREDNISONE 1 MG/1
TABLET ORAL
Qty: 200 TABLET | Refills: 1 | Status: SHIPPED | OUTPATIENT
Start: 2025-04-23

## 2025-04-23 NOTE — PROGRESS NOTES
Miladys Quarles is a 74 year old female.    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     Current Medications:  Prednisone 5 mg daily- started mid Aug 2021  Methotrexate 8 pills weekly and FA daily- started 6/24/2022  Blood work:  CRP 3.0 mg/dL, ESR 22 (8/2021), now normal  Normal CBC and CMP  Neg TAVON, RF, CCP, HLA-B27, CK    Interval History:  This is a 70 yo F with hx of HLD, Hashimoto's/Hypothyroid presents with bilateral shoulder stiffness.  She received her first Covid vaccine back in March and then developed bilateral shoulder pain and stiffness.  She states that she was not able to lift her arms.  She is only able to move from her elbows down to her hands.  Symptoms lasted for about 17 days.  She was good for 2 days and then received her second Covid vaccine.  Again developed bilateral shoulder stiffness.  Symptoms slowly got better and she was doing well up until July.  She states it was hard for her to brush her hair, lift her arms or even take off her close due to the stiffness in her shoulders.  Blood work was done and she was found to have elevated CRP of 30.  She was started on prednisone 20 mg daily and her symptoms improved significantly.  She was dropped down by every week.  When she went down to 12.5 mg daily her symptoms came back.  She was then increased to 15 mg daily and has been on that since September 10.  Still has some stiffness in her shoulders but not as bad compared to when it started.  Denies any other joint pain or swelling.  Denies any rash, psoriasis, GI or  symptoms.  She reports fatigue.  Denies any fever chills, night sweats or weight loss.  Denies any temporal pain, headache or jaw pain or blurry vision.    11/4/2021:  Presents for f/u of PMR  Now on prednisone 12.5 mg daily for the past week  Continues  to have some shoulder stiffness mostly in her right shoulder.  She has full range of motion though  No stiffness in her thighs or hip region  She reports fatigue being a big component.  She feels tired all the time  She states that her sleep is not adequate.  She wakes up multiple times at night.  At times will watch TV.  She also has gained some weight, about 6 pounds in the past 1.5 months  Denies any other joint pain or swelling, denies any GCA symptoms    1/20/2022:  Presents for f/u of PMR  No down to prednisone 6 mg daily  Recent blood work shows CRP 1.15  Minimal pain in R shoulder but has FROM  No joint swelling  No GCA symptoms     4/25/2022:  Presents for f/u of PMR  No down to prednisone 1 mg daily  Recent blood work shows CRP 1.94  She was going to have prednisone by half milligram every week.  About 3 weeks ago developed tendinitis in her right ankle.  Her PCP gave her antibiotics thinking that there could have been infection.  She was seen by podiatrist and recommended to ice the tendon.  At times she will have some right knee pain.  Now having some pain in her left upper chest area near the collarbone.  Shoulder still feels stiff.  Hips feel stiff.  Overall reports a lot of stiffness and muscle pain  Reports a lot of fatigue  No GCA symptoms    5/19/2022:  Presents for f/u of PMR likely progressed RA/spondylarthritis  Blood work still shows slightly elevated CRP of 1.94.  Negative RF, CRP and HLA-B27  Now reports worsening pain in her left collarbone, left shoulder.  Also having a lot of pain and swelling in her right Achilles tendon.  Due to the symptoms her prednisone was increased, now on 10 mg daily  Now left collar bone pain is better  Continues to have some pain and swelling in R achilles, will be doing PT  No GCA symptoms     6/9/2022:  Presents for f/u of PMR likely progressed RA/spondylarthritis  Blood work still shows slightly elevated CRP of 1.94.  Negative RF, CRP and HLA-B27  Has now been  having more pain involving her left collarbone, shoulder and even her right Achilles tendon.  Continues to have swelling in R achilles tendon. Doing PT but not helping, it was worse over the weekend.  L collar bone and shoulder are doing well  On prednisone 9 mg daily  Having some irration in the eyes, feels inflamed, no blurry vision, no GCA symptoms  Having some acid issues in the stomach  Worsening fatigue     8/3/2022:  Presents for f/u of PMR likely progressed RA/spondylarthritis  Blood work in April shows slightly elevated CRP of 1.94.  Negative RF, CRP and HLA-B27  Now down to prednisone 8 mg daily  Also on methotrexate 6 pills weekly and FA daily, on it for about 6 weeks now  Last week felt good, less stiffness and fatigue  But this week had some stiffness in hips   Some pain and stiffness in knees, but not bad, no swelling  Fatigue has improved a little  R achilles tendon pain and swelling has improved  Also having some indigestion in upper abdomen, feels bloating. Normals stools, no nausea or vomiting. Taking pepcid and gasX as needed  Also having a lot of dry eyes, saw eye doctor and started systane twice a day. Its helping the dry eyes  Also has chronic lower back pain      11/7/0222:  Presents for f/u of PMR likely progressed RA/spondylarthritis  Blood work in April shows slightly elevated CRP of 1.94.  Negative RF, CRP and HLA-B27  Now down to prednisone 4 mg daily  Also on methotrexate 6 pills weekly and FA daily  Recent blood work shows normal CBC, CRP slightly elevated at 1.28  Felt really good for 3 weeks in October, was on prednisone 5 mg daily as that time  Joint pain has improved significantly, still has stiffness though but not pain in the joints, no swelling in the joints   Able to raise both shoulders. No achilles pain or swelling  Also having a lot of fatigue for the last week    1/12/2023:  Presents for f/u of PMR likely progressed RA/spondylarthritis  Blood work in April shows slightly  elevated CRP of 1.94.  Negative RF, CRP and HLA-B27  He went down to prednisone 2 mg daily on 12/25/2022 and had worsening pain in her hips and shoulders.  She would hardly lift her arms, wash her face even walk her dog due to the symptoms.  She went back up to 4 mg daily and her symptoms improved significantly  Also on methotrexate 6 pills weekly and folic acid every day  Blood work showed normal ESR but CRP was increased 2.32.  Back in August it was normal.  When on methotrexate and prednisone 5 mg and was doing really well. Even on 3 mg and 4 mg having some symptoms.   Has had a lot of stress in the last 2 mos also.  Having stiffness in the hips, hard to walk. Also having some pain in r shoulder. 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 Left hand feels like its swollen at times  Having more dry eyes, using otc eye drops now  Shoulder pain has now gone, some stiffness in hips now   Does not have an heel pain anymore   Blood work showing elevated CRP 1.12  Bone density was done March and normal    7/20/2023:  Presents for f/u of PMR likely progressed RA/spondylarthritis  Currently on methotrexate 8 pills weekly and prednisone 5 mg daily  Recent blood work shows mildly elevated CRP 1.11  At times will have fatigue  Has had some hip pain when sleeping but  resolved  No stiffness in the morning but has stiffness after prolonged siting or after car ride  Overall doing better    10/30/2023:  Presents for f/u of PMR likely progressed RA/spondylarthritis  Currently on methotrexate 8 pills weekly and prednisone 5 mg daily  Recent blood work shows normal inflammatory markers  Starting in Aug 2023 due to some left ear pain and headache and was given doxycycline and now having ringing in both ears since then.   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 When laying on the right side can have some pain in right hp  Hands are stable  Some mild right knee pain  Achilles pain has been stable  Shoulders are good    3/4/2024:  Presents for f/u of PMR likely progressed RA/spondylarthritis  Currently on methotrexate 8 pills weekly and prednisone 5 mg daily  Recent blood work shows mildly elevated CRP 2.10 mg/dL  In January had a sinus/respiratory infection and had it for about 1 month and since then having some pain in left hip  Has to  left hip to put in the car, hard to step with the right leg first. Hard to bend at the right  hip. Also hard to sleep due to pain in the right hip  Having some pain in the right shoulder, flexion and reaching in the cabinet is difficult. Has no issue with r shoulder abduction.   Having worsening pain in the neck  Minimal pain in the hands     6/12/2024:  Presents for f/u of PMR likely progressed RA/spondylarthritis  Currently on methotrexate 8 pills weekly and prednisone 5 mg daily  She was having worsening right hip pain.  MRI of the hip showed moderate to severe osseous edema including contusion, reactive subchondral  edema/stress reaction in the hip joint.  Also showed moderately severe gluteus minimus and anterior gluteus medius tendinosis of the greater trochanteric bursa  She was following with physiatry right acetabulum stress reaction/stress fracture  She had a diagnostic injection with minimal improvement.  They were concerned about her bone health.  They are recommending conservative treatment but if no improvement then a hip replacement  Had bone density done March 2023 which was essentially normal, left femoral neck T-score -0.4, lumbar spine -0.1  Continues to have pain in right hip, has to use walker now   Recent blood work showing mildly elevated CRP 1.00  She also tried PT but right hip pain worsened   Having some mild hand pain and some right knee pain but not severe    9/24/2024:  Presents for f/u of PMR likely progressed RA/spondylarthritis  Currently on methotrexate 8 pills weekly and prednisone 5 mg daily  She has R hip pain and is following with physiatry right acetabulum stress reaction/stress fracture  Continues to have right hip pain, she had a cortisone injection but did not help  She had a 2nd opinion with orthopedic and stated she is not a candidate for surgical intervention   She will be seeing physiatry Dr. Leal  Continues to have pain in the right hip, using a walker and hard to get up at times. When putting weight on it causes more pain  Minimal pain in the left hip  Some pain in the thighs  Other joints are doing well  Recent blood work shows normal kidney and liver test and normal inflammation markers  She was off methotrexate for 3 weeks due to diverticulitis and was placed on Abx     4/23/2025:  Presents for f/u of PMR likely progressed RA/spondylarthritis  Currently on methotrexate 8 pills weekly and prednisone 5 mg daily  Right hip was replaced 12/2024 and is now doing really well  Right hip pain has resolved   She had the Flu in Feb and had to go on tamiflu   Has some stiffness in the hands,  no swelling  Minimal pain in the shoulders   Rheumatoid arthritis  Joints are doing really well  Your right hip is also doing really well            HISTORY:  Past Medical History:    Fibromyalgia    possibly    Hyperlipidemia      Social Hx Reviewed   Family Hx Reviewed     Medications (Active prior to today's visit):  Current Outpatient Medications   Medication Sig Dispense Refill    PREDNISONE 5 MG Oral Tab Take 1 tablet by mouth once daily 90 tablet 0    FOLIC ACID 1 MG Oral Tab Take 1 tablet by mouth once daily 90 tablet 3    methotrexate 2.5 MG Oral Tab Take 8 tablets (20 mg total) by mouth once a week. 104 tablet 0    metFORMIN  MG Oral Tablet 24 Hr Take 1 tablet (500 mg total) by mouth daily.      folic acid 1 MG Oral Tab Take 1 tablet (1 mg total) by mouth daily. 90 tablet 2    levothyroxine 112 MCG Oral Tab Take 1 tablet (112 mcg total) by mouth before breakfast. 1/2 tab on Sundays (Patient taking differently: Take 1 tablet (112 mcg total) by mouth before breakfast. 1/2 tab on Sundays and Wednesdays)      Fexofenadine HCl (ALLEGRA ALLERGY OR) Take by mouth as needed.      ascorbic acid 1000 MG Oral Tab Take 1 tablet (1,000 mg total) by mouth daily.      omega-3 fatty acids 1000 MG Oral Cap Take 3,000 mg by mouth daily.      magnesium 250 MG Oral Tab Take 1 tablet (250 mg total) by mouth.      Fluticasone Propionate 50 MCG/ACT Nasal Suspension Flonase 50 mcg/actuation nasal spray,suspension   Spray 2 sprays every day by nasal route for 30 days.      Multiple Vitamins-Minerals (CENTRUM SILVER ULTRA WOMENS) Oral Tab Take by mouth.      B Complex Vitamins (VITAMIN B COMPLEX OR) Inject as directed.      Cholecalciferol (VITAMIN D) 50 MCG (2000 UT) Oral Tab Take by mouth 2 (two) times a day.      Menaquinone-7 (VITAMIN K2 OR) Take by mouth.      Calcium Carbonate-Vit D-Min (CALTRATE 600+D PLUS OR) Take by mouth.      Krill Oil 500 MG Oral Cap Take by mouth.      Probiotic Product (fflick OR)  Take by mouth.      Lactobacillus (ACIDOPHOLUS OR) Take by mouth.      GLUCOSAMINE-CHONDROITIN OR Take by mouth.      Albuterol Sulfate  (90 Base) MCG/ACT Inhalation Aero Soln as needed for Shortness of Breath.       .cmed  Allergies:  Allergies   Allergen Reactions    Penicillins RASH    Sulfa Antibiotics RASH       ROS:   All other ROS are negative.     PHYSICAL EXAM:   GEN: AAOx3, NAD  HEENT: EOMI, PERRLA, no injection or icterus, oral mucosa moist, no oral lesions. No lymphadenopathy. No facial rash  CVS: RRR, no murmurs rubs or gallops. Equal 2+ distal pulses.   LUNGS: CTAB, no increased work of breathing  ABDOMEN:  soft NT/ND, +BS, no HSM  SKIN: No rashes or skin lesions. No nail findings  MSK:  Cervical spine: FROM  Hands: no synovitis in DIP, PIP and MCP, strong full fists  Wrist: FROM, no pain or swelling or warmth on palpation  Elbow: FROM, no pain or swelling or warmth on palpation  Shoulders: R shoulder has FROM  Hip: Right FROM  Knees: FROM, no warmth or effusion present. No pain with ROM.   Ankles: R achilles swelling improved  Feet: no pain with MTP squeeze, no toe swelling or pain or warmth on palpation with FROM  Spine: no lumbar or sacral pain on palpation.  NEURO: Cranial nerves II-XII intact grossly. 5/5 strength throughout in both upper and lower extremities, sensation intact.  PSYCH: normal mood       LABS:     Component      Latest Ref Rng & Units 9/15/2021   Quantiferon TB NIL      IU/mL 0.03   Quantiferon-TB1 Minus NIL      IU/mL 0.08   Quantiferon-TB2 Minus NIL      IU/mL 0.02   Quantiferon TB Mitogen minus NIL      IU/mL >10.00   QTB.RESULT      Negative Negative   HBSAg Screen      Nonreactive  Nonreactive   Hbsag Screen Index       <0.10   HEPATITIS B SURFACE AB QUAL      Nonreactive  Nonreactive   HEPATITIS B SURFACE AB QUANT      mIU/mL <3.10   C-Citrullinated Peptide IgG AB      0.0 - 6.9 U/mL 0.5   RHEUMATOID FACTOR      <15 IU/mL <10   HEPATITIS B CORE AB, TOTAL       Nonreactive  Nonreactive   GLUCOSE-6-PHOSPHATE DEHYDROGEN      9.9 - 16.6 U/g Hb 11.7   HCV AB      Nonreactive  Nonreactive     Imaging:     None    ASSESSMENT/PLAN:     B/L shoulder stiffness 2/2 PMR now likely progressed to RA/spondylarthritis- stable  - Symptoms are classic for PMR.  Bilateral shoulder stiffness.  Elevated CRP and response to prednisone.    - No giant cell arteritis symptoms  - On methotrexate 8 pills weekly and folic acid daily   - Plan to taper prednisone, she will take 4 mg daily for 3 weeks and continue to go down by 1 mg every 3 weeks  - Blood work reviewed with patient, will continue to monitor every 3 mos     Right THR  - Right hip was replaced December 2024, doing really well, minimal pain now    Recent diverticulitis  - treated with Abx and resolved     Right Achilles tendinitis likely 2/2 Above- improved  - continue above treatment     Pt will f/u in 3 mos     There is a longitudinal care relationship with me, the care plan reflects the ongoing nature of the continuous relationship of care, and the medical record indicates that there is ongoing treatment of a serious/complex medical condition which I am currently managing.  is Applicable.     Vida Cristina MD  4/23/2025  1:55 PM

## 2025-04-23 NOTE — PATIENT INSTRUCTIONS
You were seen today for  PMR and rheumatoid arthritis  Joints are doing really well  Continue methotrexate and folic acid  Try tapering prednisone, 4 mg daily for 3 weeks then continue to go down by 1 mg every 3 weeks and then stop  Blood work in 3 months  See me in August

## 2025-07-21 ENCOUNTER — TELEPHONE (OUTPATIENT)
Age: 75
End: 2025-07-21

## 2025-07-21 NOTE — TELEPHONE ENCOUNTER
Patient went to the er twice. Wants to speak to Dr. Cristina to explained what happened. Where she stands now and to receive direction. Patient had an ra flare up

## 2025-07-24 ENCOUNTER — LAB ENCOUNTER (OUTPATIENT)
Dept: LAB | Facility: HOSPITAL | Age: 75
End: 2025-07-24
Attending: INTERNAL MEDICINE
Payer: MEDICARE

## 2025-07-24 DIAGNOSIS — M06.09 RHEUMATOID ARTHRITIS OF MULTIPLE SITES WITH NEGATIVE RHEUMATOID FACTOR (HCC): ICD-10-CM

## 2025-07-24 DIAGNOSIS — M35.3 PMR (POLYMYALGIA RHEUMATICA) (HCC): ICD-10-CM

## 2025-07-24 LAB
ALT SERPL-CCNC: 53 U/L (ref 10–49)
AST SERPL-CCNC: 28 U/L (ref ?–34)
BASOPHILS # BLD: 0 X10(3) UL (ref 0–0.2)
BASOPHILS NFR BLD: 0 %
CREAT BLD-MCNC: 0.69 MG/DL (ref 0.55–1.02)
CRP SERPL-MCNC: 2.3 MG/DL (ref ?–0.5)
DEPRECATED RDW RBC AUTO: 52.6 FL (ref 35.1–46.3)
EGFRCR SERPLBLD CKD-EPI 2021: 92 ML/MIN/1.73M2 (ref 60–?)
EOSINOPHIL # BLD: 0 X10(3) UL (ref 0–0.7)
EOSINOPHIL NFR BLD: 0 %
ERYTHROCYTE [DISTWIDTH] IN BLOOD BY AUTOMATED COUNT: 14.8 % (ref 11–15)
ERYTHROCYTE [SEDIMENTATION RATE] IN BLOOD: 35 MM/HR (ref 0–30)
HCT VFR BLD AUTO: 39.1 % (ref 35–48)
HGB BLD-MCNC: 12.7 G/DL (ref 12–16)
LYMPHOCYTES NFR BLD: 23 %
LYMPHOCYTES NFR BLD: 4.62 X10(3) UL (ref 1–4)
MCH RBC QN AUTO: 31.4 PG (ref 26–34)
MCHC RBC AUTO-ENTMCNC: 32.5 G/DL (ref 31–37)
MCV RBC AUTO: 96.5 FL (ref 80–100)
METAMYELOCYTES # BLD: 0.31 X10(3) UL (ref ?–0.01)
METAMYELOCYTES NFR BLD: 2 %
MONOCYTES # BLD: 1.23 X10(3) UL (ref 0.1–1)
MONOCYTES NFR BLD: 8 %
MORPHOLOGY: NORMAL
NEUTROPHILS # BLD AUTO: 8.02 X10 (3) UL (ref 1.5–7.7)
NEUTROPHILS NFR BLD: 60 %
NEUTS HYPERSEG # BLD: 9.24 X10(3) UL (ref 1.5–7.7)
PLATELET # BLD AUTO: 587 10(3)UL (ref 150–450)
PLATELET MORPHOLOGY: NORMAL
RBC # BLD AUTO: 4.05 X10(6)UL (ref 3.8–5.3)
TOTAL CELLS COUNTED BLD: 100
VARIANT LYMPHS NFR BLD MANUAL: 7 % (ref ?–4)
WBC # BLD AUTO: 15.4 X10(3) UL (ref 4–11)

## 2025-07-24 PROCEDURE — 85025 COMPLETE CBC W/AUTO DIFF WBC: CPT

## 2025-07-24 PROCEDURE — 85027 COMPLETE CBC AUTOMATED: CPT

## 2025-07-24 PROCEDURE — 84460 ALANINE AMINO (ALT) (SGPT): CPT

## 2025-07-24 PROCEDURE — 85060 BLOOD SMEAR INTERPRETATION: CPT

## 2025-07-24 PROCEDURE — 85652 RBC SED RATE AUTOMATED: CPT

## 2025-07-24 PROCEDURE — 36415 COLL VENOUS BLD VENIPUNCTURE: CPT

## 2025-07-24 PROCEDURE — 85007 BL SMEAR W/DIFF WBC COUNT: CPT

## 2025-07-24 PROCEDURE — 82565 ASSAY OF CREATININE: CPT

## 2025-07-24 PROCEDURE — 86140 C-REACTIVE PROTEIN: CPT

## 2025-07-24 PROCEDURE — 84450 TRANSFERASE (AST) (SGOT): CPT

## 2025-07-24 NOTE — TELEPHONE ENCOUNTER
Patient sent this My Chart message today:    Miladys HERNÁNDEZ Em [facility] Clinical Staff (supporting Vida Cristina MD)19 minutes ago (2:35 PM)     LG  I had called your office 7/21 and asked to talk to you, a message was taken and I was told I would get a call that day or the next .  I went to my PC  7/15 probably a virus.  Went to the ER 7/17 5 pm had a fever, headache, sore throat, since 7/13 body aches & stiffness even longer.     released 8:30 pm about  1 am I was taken back to the ER by ambulance.  I had become delerius.  Released 7/20 . I have been on Cephalexin 500 mg 2 x daily.  I did not take my Methotrexate last Friday.  When in the hospital they did increase my Prednisone to 10 mg for 2 days and then said to continue with 5 mg prednisone.  When should I start Methotrexate again.  I see you on August 1.      Message sent back to patient asking how long she will be on antibiotics.

## 2025-07-25 NOTE — TELEPHONE ENCOUNTER
Called patient back.  She was hospitalized for a presumed infection.  Her prednisone was increased to 10 mg then down to 5 mg and now 4 mg daily.  Recent blood work showing leukocytosis with high inflammation markers.  She will stay on prednisone 4 mg daily until she sees me next week

## 2025-08-01 ENCOUNTER — OFFICE VISIT (OUTPATIENT)
Age: 75
End: 2025-08-01

## 2025-08-01 VITALS
HEART RATE: 71 BPM | HEIGHT: 61 IN | SYSTOLIC BLOOD PRESSURE: 132 MMHG | DIASTOLIC BLOOD PRESSURE: 77 MMHG | WEIGHT: 178 LBS | BODY MASS INDEX: 33.61 KG/M2

## 2025-08-01 DIAGNOSIS — M06.09 RHEUMATOID ARTHRITIS OF MULTIPLE SITES WITH NEGATIVE RHEUMATOID FACTOR (HCC): ICD-10-CM

## 2025-08-01 DIAGNOSIS — Z51.81 MEDICATION MONITORING ENCOUNTER: ICD-10-CM

## 2025-08-01 DIAGNOSIS — M35.3 PMR (POLYMYALGIA RHEUMATICA) (HCC): Primary | ICD-10-CM

## 2025-08-01 PROCEDURE — 99214 OFFICE O/P EST MOD 30 MIN: CPT | Performed by: INTERNAL MEDICINE

## 2025-08-01 PROCEDURE — G2211 COMPLEX E/M VISIT ADD ON: HCPCS | Performed by: INTERNAL MEDICINE

## (undated) NOTE — LETTER
4/25/2022          To Whom It May Concern:    Collette Leyland is currently under my medical care for polymyalgia rheumatica which is an autoimmune disease causing a lot of joint pain and stiffness. Please excuse her from jury duty on May 5, 2022 as it will be difficult for her to go to due to her autoimmune condition    If you require additional information please contact our office.         Sincerely,    Zainab Pastrana MD          Document generated by:  Zainab Pastrana MD

## (undated) NOTE — LETTER
10/31/2024      Jitendra Leal MD  Physical Medicine and Rehabilitation  [address] [place] [address], [address]  [facility] [address]  Dept: [phone]  Dept Fax: [phone]        RE: Consultation for Miladys Quarles        Dear ELENA BRUMFIELD,    Thank you very much for the opportunity to see your patient.  Attached please find a summary from your patient's recent visit.     I appreciate the chance to take care of your patient with you.  Please feel free to call me with any questions or concerns.    Sincerely,        Jitendra Leal MD  Electronically Signed on 10/31/2024

## (undated) NOTE — LETTER
9/26/2024      Jitendra Leal MD  Physical Medicine and Rehabilitation  [address] [place] [address], [address]  [facility] [address]  Dept: [phone]  Dept Fax: [phone]        RE: Consultation for Miladys Quarles        Dear ELENA BRUMFIELD,    Thank you very much for the opportunity to see your patient.  Attached please find a summary from your patient's recent visit.     I appreciate the chance to take care of your patient with you.  Please feel free to call me with any questions or concerns.    Sincerely,        Jitendra Leal MD  Electronically Signed on 9/26/2024